# Patient Record
Sex: FEMALE | Race: BLACK OR AFRICAN AMERICAN | Employment: UNEMPLOYED | URBAN - METROPOLITAN AREA
[De-identification: names, ages, dates, MRNs, and addresses within clinical notes are randomized per-mention and may not be internally consistent; named-entity substitution may affect disease eponyms.]

---

## 2023-01-15 ENCOUNTER — APPOINTMENT (OUTPATIENT)
Dept: CT IMAGING | Age: 43
End: 2023-01-15
Attending: EMERGENCY MEDICINE
Payer: MEDICARE

## 2023-01-15 ENCOUNTER — HOSPITAL ENCOUNTER (EMERGENCY)
Age: 43
Discharge: HOME | End: 2023-01-15
Attending: EMERGENCY MEDICINE
Payer: MEDICARE

## 2023-01-15 VITALS
RESPIRATION RATE: 18 BRPM | HEART RATE: 89 BPM | DIASTOLIC BLOOD PRESSURE: 84 MMHG | TEMPERATURE: 98.1 F | BODY MASS INDEX: 28.93 KG/M2 | OXYGEN SATURATION: 100 % | HEIGHT: 66 IN | SYSTOLIC BLOOD PRESSURE: 139 MMHG | WEIGHT: 180 LBS

## 2023-01-15 DIAGNOSIS — M25.552 LEFT HIP PAIN: Primary | ICD-10-CM

## 2023-01-15 DIAGNOSIS — N20.0 BILATERAL NEPHROLITHIASIS: ICD-10-CM

## 2023-01-15 DIAGNOSIS — R31.29 MICROSCOPIC HEMATURIA: ICD-10-CM

## 2023-01-15 DIAGNOSIS — M54.42 ACUTE LEFT-SIDED LOW BACK PAIN WITH LEFT-SIDED SCIATICA: ICD-10-CM

## 2023-01-15 LAB
APPEARANCE UR: ABNORMAL
BACTERIA URNS QL MICRO: ABNORMAL /HPF
BILIRUB UR QL: NEGATIVE
COLOR UR: YELLOW
EPITH CASTS URNS QL MICRO: ABNORMAL /LPF
GLUCOSE UR STRIP.AUTO-MCNC: NEGATIVE MG/DL
HCG UR QL: NEGATIVE
HGB UR QL STRIP: ABNORMAL
KETONES UR QL STRIP.AUTO: NEGATIVE MG/DL
LEUKOCYTE ESTERASE UR QL STRIP.AUTO: NEGATIVE
NITRITE UR QL STRIP.AUTO: NEGATIVE
PH UR STRIP: 5 (ref 5–8)
PROT UR STRIP-MCNC: NEGATIVE MG/DL
RBC #/AREA URNS HPF: ABNORMAL /HPF (ref 0–5)
SP GR UR REFRACTOMETRY: 1.01 (ref 1–1.03)
UROBILINOGEN UR QL STRIP.AUTO: 0.1 EU/DL (ref 0.2–1)
WBC URNS QL MICRO: ABNORMAL /HPF (ref 0–4)

## 2023-01-15 PROCEDURE — 87086 URINE CULTURE/COLONY COUNT: CPT

## 2023-01-15 PROCEDURE — 99284 EMERGENCY DEPT VISIT MOD MDM: CPT

## 2023-01-15 PROCEDURE — 81001 URINALYSIS AUTO W/SCOPE: CPT

## 2023-01-15 PROCEDURE — 96372 THER/PROPH/DIAG INJ SC/IM: CPT

## 2023-01-15 PROCEDURE — 74176 CT ABD & PELVIS W/O CONTRAST: CPT

## 2023-01-15 PROCEDURE — 81025 URINE PREGNANCY TEST: CPT

## 2023-01-15 PROCEDURE — 74011250636 HC RX REV CODE- 250/636: Performed by: EMERGENCY MEDICINE

## 2023-01-15 PROCEDURE — 74011000250 HC RX REV CODE- 250: Performed by: EMERGENCY MEDICINE

## 2023-01-15 PROCEDURE — 74011250637 HC RX REV CODE- 250/637: Performed by: EMERGENCY MEDICINE

## 2023-01-15 RX ORDER — HYDROCODONE BITARTRATE AND ACETAMINOPHEN 5; 325 MG/1; MG/1
1 TABLET ORAL
Qty: 10 TABLET | Refills: 0 | Status: SHIPPED | OUTPATIENT
Start: 2023-01-15 | End: 2023-01-18

## 2023-01-15 RX ORDER — KETOROLAC TROMETHAMINE 30 MG/ML
60 INJECTION, SOLUTION INTRAMUSCULAR; INTRAVENOUS
Status: COMPLETED | OUTPATIENT
Start: 2023-01-15 | End: 2023-01-15

## 2023-01-15 RX ORDER — NAPROXEN 250 MG/1
250 TABLET ORAL 2 TIMES DAILY WITH MEALS
Qty: 10 TABLET | Refills: 0 | Status: SHIPPED | OUTPATIENT
Start: 2023-01-15 | End: 2023-01-20

## 2023-01-15 RX ORDER — LIDOCAINE 4 G/100G
1 PATCH TOPICAL ONCE
Status: DISCONTINUED | OUTPATIENT
Start: 2023-01-15 | End: 2023-01-16 | Stop reason: HOSPADM

## 2023-01-15 RX ORDER — SERTRALINE HYDROCHLORIDE 100 MG/1
100 TABLET, FILM COATED ORAL DAILY
COMMUNITY

## 2023-01-15 RX ORDER — LIDOCAINE 4 G/100G
1 PATCH TOPICAL EVERY 24 HOURS
Status: DISCONTINUED | OUTPATIENT
Start: 2023-01-15 | End: 2023-01-15

## 2023-01-15 RX ORDER — METHYLPREDNISOLONE 4 MG/1
TABLET ORAL
Qty: 1 DOSE PACK | Refills: 0 | Status: SHIPPED | OUTPATIENT
Start: 2023-01-15

## 2023-01-15 RX ORDER — GABAPENTIN 300 MG/1
300 CAPSULE ORAL 3 TIMES DAILY
COMMUNITY

## 2023-01-15 RX ORDER — OXYCODONE HYDROCHLORIDE 5 MG/1
10 TABLET ORAL
Status: COMPLETED | OUTPATIENT
Start: 2023-01-15 | End: 2023-01-15

## 2023-01-15 RX ORDER — TIZANIDINE 2 MG/1
2 TABLET ORAL
Qty: 12 TABLET | Refills: 0 | Status: SHIPPED | OUTPATIENT
Start: 2023-01-15 | End: 2023-01-19

## 2023-01-15 RX ADMIN — OXYCODONE HYDROCHLORIDE 10 MG: 5 TABLET ORAL at 21:08

## 2023-01-15 RX ADMIN — KETOROLAC TROMETHAMINE 60 MG: 30 INJECTION, SOLUTION INTRAMUSCULAR at 21:08

## 2023-01-15 NOTE — ED TRIAGE NOTES
Pt states lower back pain and groin pain worse with position change and movement x 4 days, now having polyuria

## 2023-01-16 NOTE — DISCHARGE INSTRUCTIONS
Thank you for allowing us to provide you with excellent care today. We hope we addressed all of your concerns and needs. We strive to provide excellent quality care in the Emergency Department. Anything less than excellent does not meet our expectations for you. Incidental findings are findings on labs or imaging studies that do not necessarily correlate with the reason for your visit. We make every effort to inform you of these incidental findings and the proper follow-up, however it is important that you call your primary care doctor or a primary care doctor in 1 to 2 days to set up a follow-up visit so they can go through your results in detail with you, and determine if additional testing is needed. The emergency room is not intended to be complete or comprehensive care, and it serves as a means to rule out life-threatening pathologies. It is very important that you follow-up with your primary care doctor to arrange additional testing and/or treatment if needed. Red flags for back pain would include inability to urinate or urinating on yourself (incontinence), incontinence of stool, numbness in the groin area such as around the genitals, intractable pain, high fevers or limb weakness. The exam and treatment you received in the Emergency Department were for an urgent problem and are not intended as complete care. It is important that you follow-up with a doctor, nurse practitioner, or physician assistant to:  (1) confirm your diagnosis,  (2) re-evaluation of changes in your illness and treatment, and  (3) for ongoing care. If your symptoms become worse or you do not improve as expected, or you develop any new symptoms that concern you and/or you are unable to reach your usual health care provider, you should return to the Emergency Department. We are available 24 hours a day.      If your blood pressure is greater than 120/80, your blood pressure is considered elevated above normal and you need to follow up with your primary care physician or the physician provided on your discharge instructions for a blood pressure recheck. If you were prescribed narcotic medications such as hydrocodone, oxycodone, diazepam, lorazepam, alprazolam, tramadol or codeine, these medications will NOT typically be refilled in the Emergency Room. Follow up with your primary care doctor or specialist to discuss this, or you may return to the Emergency room if your condition worsens. CT ABD PELV WO CONT   Final Result   Bilateral punctate nonobstructive nephrolithiasis. No bladder calculi or hydronephrosis. Diagnosis:   1. Left hip pain    2. Acute left-sided low back pain with left-sided sciatica          Take this sheet with you when you go to your follow-up visit. If you have any problem arranging the follow-up visit, contact [phone] ([phone])    Make an appointment with your Primary Care doctor for follow up of this visit. Return to the ER if you are unable to be seen in the time recommended on your discharge instructions. It has been a pleasure caring for you today. Return to the ER or seek medical care for any worsening in your condition at any time. [employer] Activation    Thank you for requesting access to [employer]. Please follow the instructions below to securely access and download your online medical record. [employer] allows you to send messages to your doctor, view your test results, renew your prescriptions, schedule appointments, and more. How Do I Sign Up? In your internet browser, go to [email or web address]  Click on the First Time User? Click Here link in the Sign In box. You will be redirect to the New Member Sign Up page. Enter your [employer] Access Code exactly as it appears below. You will not need to use this code after youve completed the sign-up process. If you do not sign up before the expiration date, you must request a new code. [employer] Access Code:  Activation code not generated  Current [employer] Status: Active (This is the date your [employer] access code will )    Enter the last four digits of your Social Security Number (xxxx) and Date of Birth (mm/dd/yyyy) as indicated and click Submit. You will be taken to the next sign-up page. Create a [employer] ID. This will be your [employer] login ID and cannot be changed, so think of one that is secure and easy to remember. Create a [employer] password. You can change your password at any time. Enter your Password Reset Question and Answer. This can be used at a later time if you forget your password. Enter your e-mail address. You will receive e-mail notification when new information is available in [address] Click Sign Up. You can now view and download portions of your medical record. Click the [employer] link to download a portable copy of your medical information. Additional Information    If you have questions, please visit the Frequently Asked Questions section of the [employer] website at [email or web address] Remember, [employer] is NOT to be used for urgent needs. For medical emergencies, dial 911.

## 2023-01-16 NOTE — ED PROVIDER NOTES
EMERGENCY DEPARTMENT HISTORY AND PHYSICAL EXAM      Date: 1/15/2023  Patient Name: Henrique Matamoros      History of Presenting Illness     Chief Complaint   Patient presents with    Groin Pain    Back Pain    Polyuria       History Provided By: Patient  Location/Duration/Severity/Modifying factors   Patient is a 43-year-old female who is presenting to the emergency department with a chief complaint of left groin hip and low back pain. She states this been going on for couple days now, she has a history of sciatica but states that pain does not usually involve the groin and abdomen area. Pain is worse on the left side but states she has a groin pain on both sides. It is present in the buttock but does not really radiate down the leg. Denies any chest pain or shortness of breath. She has not had a fever she is aware of no incontinence or retention of urine but notes may be some polyuria, denies any saddle anesthesia or any loss of strength in the legs. It is worsened by getting up and moving and putting weight on the left lower extremity        There are no other complaints, changes, or physical findings at this time. PCP: Other, MD Aleta    Current Facility-Administered Medications   Medication Dose Route Frequency Provider Last Rate Last Admin    lidocaine 4 % patch 1 Patch  1 Patch TransDERmal ONCE Regenia Delude, DO   1 Patch at 01/15/23 2108     Current Outpatient Medications   Medication Sig Dispense Refill    gabapentin (NEURONTIN) 300 mg capsule Take 300 mg by mouth three (3) times daily. methylPREDNISolone (Medrol, Pascual,) 4 mg tablet Take per dose pack instructions 1 Dose Pack 0    tiZANidine (ZANAFLEX) 2 mg tablet Take 1 Tablet by mouth three (3) times daily as needed for Muscle Spasm(s) for up to 4 days. 12 Tablet 0    HYDROcodone-acetaminophen (Norco) 5-325 mg per tablet Take 1 Tablet by mouth every six (6) hours as needed for Pain for up to 3 days. Max Daily Amount: 4 Tablets.  10 Tablet 0 naproxen (NAPROSYN) 250 mg tablet Take 1 Tablet by mouth two (2) times daily (with meals) for 5 days. 10 Tablet 0    sertraline (ZOLOFT) 100 mg tablet Take 100 mg by mouth daily. albuterol sulfate 90 mcg/actuation aepb Take 2 Puffs by inhalation four (4) times daily as needed for Cough (SOB, wheezing). 1 Inhaler 0       Past History     Past Medical History:  Past Medical History:   Diagnosis Date    Chronic back pain     Depression     Pain management     Psychiatric disorder     PTSD, anxiety    Sciatica        Past Surgical History:  Past Surgical History:   Procedure Laterality Date    HX LUMBAR FUSION      L4 L5 S1 with surgical implant       Family History:  Family History   Problem Relation Age of Onset    Diabetes Mother     Hypertension Mother     Diabetes Sister     Diabetes Brother     Hypertension Father     Hypertension Brother        Social History:  Social History     Tobacco Use    Smoking status: Every Day     Packs/day: 0.25     Years: 15.00     Pack years: 3.75     Types: Cigarettes   Vaping Use    Vaping Use: Never used   Substance Use Topics    Alcohol use: Yes     Comment: rarely    Drug use: Not Currently       Allergies: Allergies   Allergen Reactions    Reglan [Metoclopramide] Palpitations     \"Panic attacks\"    Pcn [Penicillins] Unknown (comments)       Medications:  Current Facility-Administered Medications   Medication Dose Route Frequency Provider Last Rate Last Admin    lidocaine 4 % patch 1 Patch  1 Patch TransDERmal ONCE Kaylin Rod, DO   1 Patch at 01/15/23 1459     Current Outpatient Medications   Medication Sig Dispense Refill    gabapentin (NEURONTIN) 300 mg capsule Take 300 mg by mouth three (3) times daily. methylPREDNISolone (Medrol, Pascual,) 4 mg tablet Take per dose pack instructions 1 Dose Pack 0    tiZANidine (ZANAFLEX) 2 mg tablet Take 1 Tablet by mouth three (3) times daily as needed for Muscle Spasm(s) for up to 4 days.  12 Tablet 0 HYDROcodone-acetaminophen (Norco) 5-325 mg per tablet Take 1 Tablet by mouth every six (6) hours as needed for Pain for up to 3 days. Max Daily Amount: 4 Tablets. 10 Tablet 0    naproxen (NAPROSYN) 250 mg tablet Take 1 Tablet by mouth two (2) times daily (with meals) for 5 days. 10 Tablet 0    sertraline (ZOLOFT) 100 mg tablet Take 100 mg by mouth daily. albuterol sulfate 90 mcg/actuation aepb Take 2 Puffs by inhalation four (4) times daily as needed for Cough (SOB, wheezing). 1 Inhaler 0       Social Determinants of Health:  Social Determinants of Health     Tobacco Use: High Risk    Smoking Tobacco Use: Every Day    Smokeless Tobacco Use: Unknown    Passive Exposure: Not on file   Alcohol Use: Not on file   Financial Resource Strain: Not on file   Food Insecurity: Not on file   Transportation Needs: Not on file   Physical Activity: Not on file   Stress: Not on file   Social Connections: Not on file   Intimate Partner Violence: Not on file   Depression: Not on file   Housing Stability: Not on file     Recently moved, currently employed. Review of Systems     Review of Systems   Constitutional:  Negative for chills and fever. HENT:  Negative for congestion, rhinorrhea, sinus pressure and sneezing. Eyes:  Negative for visual disturbance. Respiratory:  Negative for cough and shortness of breath. Cardiovascular:  Negative for chest pain. Gastrointestinal:  Positive for abdominal pain. Negative for diarrhea, nausea and vomiting. Genitourinary:  Positive for flank pain. Negative for dysuria, frequency and urgency.        + Groin pain   Musculoskeletal:  Positive for back pain. Negative for neck pain. Skin:  Negative for rash. Neurological:  Negative for syncope, numbness and headaches. Physical Exam     Physical Exam  Constitutional:       General: She is not in acute distress. Appearance: Normal appearance. She is not ill-appearing or toxic-appearing.       Comments: Laying on Right side HENT:      Head: Normocephalic and atraumatic. Nose: Nose normal.      Mouth/Throat:      Mouth: Mucous membranes are moist.      Pharynx: Oropharynx is clear. No oropharyngeal exudate or posterior oropharyngeal erythema. Eyes:      Conjunctiva/sclera: Conjunctivae normal.   Cardiovascular:      Rate and Rhythm: Normal rate and regular rhythm. Pulses: Normal pulses. Heart sounds: No murmur heard. No gallop. Pulmonary:      Effort: Pulmonary effort is normal. No respiratory distress. Breath sounds: No wheezing or rales. Abdominal:      General: Abdomen is flat. Tenderness: There is no abdominal tenderness. Hernia: No hernia is present. Musculoskeletal:         General: Normal range of motion. Cervical back: Neck supple. Comments: Normal sensory exam of the lower extremities. Positive right straight leg raise. 5/5 strength in hip flexion, hip extension, 5/5 strength in knee flexion and extension bilaterally. 5/5 strength in plantar dorsiflexion, EHL extension is 5/5 bilaterally. Skin:     General: Skin is warm and dry. Neurological:      General: No focal deficit present. Mental Status: She is alert.        Lab and Diagnostic Study Results     Labs -  Recent Results (from the past 24 hour(s))   URINALYSIS W/ RFLX MICROSCOPIC    Collection Time: 01/15/23  7:15 PM   Result Value Ref Range    Color Yellow      Appearance Hazy (A) Clear      Specific gravity 1.015 1.003 - 1.030      pH (UA) 5.0 5.0 - 8.0      Protein Negative Negative mg/dL    Glucose Negative Negative mg/dL    Ketone Negative Negative mg/dL    Bilirubin Negative Negative      Blood Large (A) Negative      Urobilinogen 0.1 (L) 0.2 - 1.0 EU/dL    Nitrites Negative Negative      Leukocyte Esterase Negative Negative     URINE MICROSCOPIC    Collection Time: 01/15/23  7:15 PM   Result Value Ref Range    WBC 0-4 0 - 4 /hpf    RBC 10-20 0 - 5 /hpf    Epithelial cells Few Few /lpf    Bacteria 1+ (A) Negative /hpf   POC HCG,URINE    Collection Time: 01/15/23  7:21 PM   Result Value Ref Range    Pregnancy test,urine (POC) Negative Negative           Radiologic Studies -   CT ABD PELV WO CONT   Final Result   Bilateral punctate nonobstructive nephrolithiasis. No bladder calculi or hydronephrosis. Medical Decision Making and ED Course   - I am the first and primary provider for this patient AND AM THE PRIMARY PROVIDER OF RECORD. - I reviewed the vital signs, available nursing notes, past medical history, past surgical history, family history and social history. - Initial assessment performed. The patients presenting problems have been discussed, and the staff are in agreement with the care plan formulated and outlined with them. I have encouraged them to ask questions as they arise throughout their visit. Vital Signs-Reviewed the patient's vital signs. Patient Vitals for the past 24 hrs:   Temp Pulse Resp BP SpO2   01/15/23 2240 98.1 °F (36.7 °C) 89 18 139/84 100 %   01/15/23 1903 99.1 °F (37.3 °C) 96 18 111/73 100 %         Nursing notes and pertinent past medical history including imaging and labs have been reviewed (if available)      Provider Notes (Medical Decision Making):     MDM  Number of Diagnoses or Management Options  Acute left-sided low back pain with left-sided sciatica  Bilateral nephrolithiasis  Left hip pain  Microscopic hematuria  Diagnosis management comments: Patient presents with left-sided low back and abdominal pain. Urine reflects some microscopic hematuria, raising question of possible kidney stone, will do CT to rule out kidney stone or other intra-abdominal or pelvic process. Her symptoms are not concerning for ovarian torsion, appendicitis or bowel obstruction. She does not have any symptoms concerning for cauda equina syndrome, conus medullaris or spinal epidural abscess. Ultimately, symptoms may be related to sciatica.     Results read patient reassessed. CT shows intrarenal punctate stones unlikely to cause patient any pain. Unclear etiology for the hematuria. We will do urine culture. Discussed with the patient and has had follow-up with her PCP to have a recheck of the urine to ensure that hematuria resolves. Suspect likely sciatica or musculoskeletal pain. Will treat with a Medrol Dosepak, pain medicine and muscle relaxer and close PCP follow-up. Vies follow-up with orthospine if needed as she has had previous back surgery. Advised to return if she develops any worsening symptoms. Results reviewed and patient reassessed. CT shows intrarenal stones without any obstructing kidney stone. Patient overall had improvement in her symptoms with analgesia and anti-inflammatory medications. Will treat for presumptive sciatica. Counseled on red flags for back pain, advised to return if any develop or any worsening in her general condition. Advise follow-up for microscopic hematuria. She is new to the area and does not currently have PCP resources. We will give her this as well as orthopedic/spine follow-up. ED Course:          _________________________________________________________________    At this time, patient is stable and appropriate for discharge home. Patient demonstrates understanding of current diagnoses and is in agreement with the treatment plan. They are advised that while the likelihood of serious underlying condition is low at this point given the evaluation performed today, we cannot fully rule it out. They are advised to immediately return with any new symptoms or worsening of current condition. Any Incidental findings were noted on the patient's discharge paperwork as well as verbally directly to the patient, and the appropriate follow-up was given to the patient as far as instructions on testing needed as well as the timeframe. All questions have been answered.   Patient is given educational material regarding their diagnoses, including danger symptoms and when to return to the ED. This note was dictated utilizing Dragon voice recognition software. Unfortunately this leads to occasional typographical errors. I apologize in advance if the situation occurs. If questions occur please do not hesitate to contact me directly. Michaelle Pulling, DO          Procedures and Critical Care   Performed by: Michaelle Pulling, DO  Procedures         Michaelle Pulling, DO      Diagnosis:   1. Left hip pain    2. Acute left-sided low back pain with left-sided sciatica    3. Bilateral nephrolithiasis    4. Microscopic hematuria          Disposition: Discharge    Follow-up Information       Follow up With Specialties Details Why Contact Info    Tamika Ann MD Orthopedic Surgery Call in 1 day Ortho/Spine [address] [place] [phone]  Call in 1 day Arrange follow up with Orthopedics.  [facility]  [phone]    [address] [facility] Call in 1 day PCP Follow Up [address] [facility] [place] [phone]  [address] [facility] In 1 day Primary Care follow up [place]  [address] [facility] Call in 1 day PCP Follow up [address] [facility] [phone]  [address] [place] [address] Call in 1 day PCP Follow Up [address] [facility] [address], [place] [address] [facility] [place]  Call today Low Cost/flexible PCP follow up [address] [place] [address]  [phone]            Discharge Medication List as of 1/15/2023 10:36 PM        START taking these medications Details   methylPREDNISolone (Medrol, Pascual,) 4 mg tablet Take per dose pack instructions, Normal, Disp-1 Dose Pack, R-0      tiZANidine (ZANAFLEX) 2 mg tablet Take 1 Tablet by mouth three (3) times daily as needed for Muscle Spasm(s) for up to 4 days. , Normal, Disp-12 Tablet, R-0      HYDROcodone-acetaminophen (Norco) 5-325 mg per tablet Take 1 Tablet by mouth every six (6) hours as needed for Pain for up to 3 days. Max Daily Amount: 4 Tablets., Normal, Disp-10 Tablet, R-0      naproxen (NAPROSYN) 250 mg tablet Take 1 Tablet by mouth two (2) times daily (with meals) for 5 days. , Normal, Disp-10 Tablet, R-0           CONTINUE these medications which have NOT CHANGED    Details   gabapentin (NEURONTIN) 300 mg capsule Take 300 mg by mouth three (3) times daily. , Historical Med      sertraline (ZOLOFT) 100 mg tablet Take 100 mg by mouth daily. , Historical Med      albuterol sulfate 90 mcg/actuation aepb Take 2 Puffs by inhalation four (4) times daily as needed for Cough (SOB, wheezing). , Print, Disp-1 Inhaler, R-0             Patient seen in the context of the Novel Coronavirus (COVID19) pandemic, utilizing contemporary protocols and evidence based on the most up to date available evidence, understanding that the current evidence has the potential to change as additional information becomes available. This note is dictated utilizing Dragon voice recognition software. Unfortunately this leads to occasional typographical errors using the voice recognition. I apologize in advance if the situation occurs. If questions occur please do not hesitate to contact me directly.     Estrada Ingram, DO

## 2023-01-17 LAB
BACTERIA SPEC CULT: NORMAL
SPECIAL REQUESTS,SREQ: NORMAL

## 2023-03-08 ENCOUNTER — TRANSCRIBE ORDER (OUTPATIENT)
Dept: SCHEDULING | Age: 43
End: 2023-03-08

## 2023-03-08 DIAGNOSIS — Z98.1 HISTORY OF LUMBAR SPINAL FUSION: Primary | ICD-10-CM

## 2023-03-08 DIAGNOSIS — M54.16 CHRONIC LEFT-SIDED LUMBAR RADICULOPATHY: ICD-10-CM

## 2023-08-21 ENCOUNTER — HOSPITAL ENCOUNTER (EMERGENCY)
Facility: HOSPITAL | Age: 43
Discharge: LEFT BEFORE DISCHARGE | End: 2023-08-21
Payer: MEDICARE

## 2023-08-21 VITALS
TEMPERATURE: 98.5 F | WEIGHT: 168 LBS | RESPIRATION RATE: 19 BRPM | HEIGHT: 66 IN | BODY MASS INDEX: 27 KG/M2 | DIASTOLIC BLOOD PRESSURE: 81 MMHG | OXYGEN SATURATION: 99 % | SYSTOLIC BLOOD PRESSURE: 131 MMHG | HEART RATE: 105 BPM

## 2023-08-21 PROCEDURE — 93005 ELECTROCARDIOGRAM TRACING: CPT | Performed by: NURSE PRACTITIONER

## 2023-08-21 ASSESSMENT — PAIN - FUNCTIONAL ASSESSMENT: PAIN_FUNCTIONAL_ASSESSMENT: 0-10

## 2023-08-21 ASSESSMENT — PAIN DESCRIPTION - LOCATION: LOCATION: CHEST;BACK

## 2023-08-21 ASSESSMENT — PAIN SCALES - GENERAL: PAINLEVEL_OUTOF10: 8

## 2023-08-21 NOTE — ED TRIAGE NOTES
Pt endorses back pain that started this morning around 0600 with onset of sharp chest pain that started around 1100.  Pt denies cardiac hx

## 2023-08-22 LAB
EKG ATRIAL RATE: 94 BPM
EKG DIAGNOSIS: NORMAL
EKG P AXIS: 79 DEGREES
EKG P-R INTERVAL: 160 MS
EKG Q-T INTERVAL: 368 MS
EKG QRS DURATION: 72 MS
EKG QTC CALCULATION (BAZETT): 460 MS
EKG R AXIS: 68 DEGREES
EKG T AXIS: 64 DEGREES
EKG VENTRICULAR RATE: 94 BPM

## 2023-08-23 ENCOUNTER — HOSPITAL ENCOUNTER (EMERGENCY)
Facility: HOSPITAL | Age: 43
Discharge: LEFT BEFORE DISCHARGE | End: 2023-08-23
Attending: FAMILY MEDICINE

## 2023-08-23 NOTE — ED TRIAGE NOTES
Pt recently [facility] at approx 1200 today. Presents now for abdominal pain that was being worked up for cholecystitis. Pt walked out during triage after finding out that [employer] was not a [facility] facility and she could stated \"I cannot be charged twice for MRI's. I'm not going to pay for that\"  Pt then proceeded to get up and leave the triage room.

## 2023-09-07 ENCOUNTER — APPOINTMENT (OUTPATIENT)
Facility: HOSPITAL | Age: 43
End: 2023-09-07
Payer: MEDICARE

## 2023-09-07 ENCOUNTER — HOSPITAL ENCOUNTER (EMERGENCY)
Facility: HOSPITAL | Age: 43
Discharge: HOME | End: 2023-09-07
Attending: EMERGENCY MEDICINE | Admitting: EMERGENCY MEDICINE
Payer: MEDICARE

## 2023-09-07 VITALS
HEIGHT: 66 IN | DIASTOLIC BLOOD PRESSURE: 72 MMHG | RESPIRATION RATE: 15 BRPM | HEART RATE: 88 BPM | BODY MASS INDEX: 27.32 KG/M2 | SYSTOLIC BLOOD PRESSURE: 105 MMHG | WEIGHT: 170 LBS | TEMPERATURE: 98.6 F | OXYGEN SATURATION: 98 %

## 2023-09-07 DIAGNOSIS — R10.9 ABDOMINAL PAIN, UNSPECIFIED ABDOMINAL LOCATION: ICD-10-CM

## 2023-09-07 DIAGNOSIS — D72.829 LEUKOCYTOSIS, UNSPECIFIED TYPE: Primary | ICD-10-CM

## 2023-09-07 DIAGNOSIS — R31.9 HEMATURIA, UNSPECIFIED TYPE: ICD-10-CM

## 2023-09-07 LAB
ALBUMIN SERPL-MCNC: 3.3 G/DL (ref 3.5–5)
ALBUMIN/GLOB SERPL: 0.8 (ref 1.1–2.2)
ALP SERPL-CCNC: 76 U/L (ref 45–117)
ALT SERPL-CCNC: 17 U/L (ref 12–78)
ANION GAP SERPL CALC-SCNC: 6 MMOL/L (ref 5–15)
APPEARANCE UR: CLEAR
AST SERPL-CCNC: 10 U/L (ref 15–37)
BACTERIA URNS QL MICRO: NEGATIVE /HPF
BASOPHILS # BLD: 0 K/UL (ref 0–0.1)
BASOPHILS NFR BLD: 0 % (ref 0–1)
BILIRUB SERPL-MCNC: 0.3 MG/DL (ref 0.2–1)
BILIRUB UR QL: NEGATIVE
BUN SERPL-MCNC: 7 MG/DL (ref 6–20)
BUN/CREAT SERPL: 7 (ref 12–20)
CALCIUM SERPL-MCNC: 8.6 MG/DL (ref 8.5–10.1)
CHLORIDE SERPL-SCNC: 108 MMOL/L (ref 97–108)
CO2 SERPL-SCNC: 26 MMOL/L (ref 21–32)
COLOR UR: ABNORMAL
CREAT SERPL-MCNC: 1.01 MG/DL (ref 0.55–1.02)
DIFFERENTIAL METHOD BLD: ABNORMAL
EOSINOPHIL # BLD: 0 K/UL (ref 0–0.4)
EOSINOPHIL NFR BLD: 0 % (ref 0–7)
EPITH CASTS URNS QL MICRO: ABNORMAL /LPF
ERYTHROCYTE [DISTWIDTH] IN BLOOD BY AUTOMATED COUNT: 14.4 % (ref 11.5–14.5)
GLOBULIN SER CALC-MCNC: 4 G/DL (ref 2–4)
GLUCOSE SERPL-MCNC: 146 MG/DL (ref 65–100)
GLUCOSE UR STRIP.AUTO-MCNC: NEGATIVE MG/DL
HCG UR QL: NEGATIVE
HCT VFR BLD AUTO: 35.4 % (ref 35–47)
HGB BLD-MCNC: 11.9 G/DL (ref 11.5–16)
HGB UR QL STRIP: ABNORMAL
IMM GRANULOCYTES # BLD AUTO: 0 K/UL (ref 0–0.04)
IMM GRANULOCYTES NFR BLD AUTO: 0 % (ref 0–0.5)
KETONES UR QL STRIP.AUTO: ABNORMAL MG/DL
LACTATE SERPL-SCNC: 2.1 MMOL/L (ref 0.4–2)
LEUKOCYTE ESTERASE UR QL STRIP.AUTO: NEGATIVE
LIPASE SERPL-CCNC: 86 U/L (ref 73–393)
LYMPHOCYTES # BLD: 6.4 K/UL (ref 0.8–3.5)
LYMPHOCYTES NFR BLD: 46 % (ref 12–49)
MCH RBC QN AUTO: 23 PG (ref 26–34)
MCHC RBC AUTO-ENTMCNC: 33.6 G/DL (ref 30–36.5)
MCV RBC AUTO: 68.3 FL (ref 80–99)
MONOCYTES # BLD: 0.4 K/UL (ref 0–1)
MONOCYTES NFR BLD: 3 % (ref 5–13)
NEUTS BAND NFR BLD MANUAL: 2 %
NEUTS SEG # BLD: 7.2 K/UL (ref 1.8–8)
NEUTS SEG NFR BLD: 49 % (ref 32–75)
NITRITE UR QL STRIP.AUTO: NEGATIVE
NRBC # BLD: 0 K/UL (ref 0–0.01)
NRBC BLD-RTO: 0 PER 100 WBC
PH UR STRIP: 5.5 (ref 5–8)
PLATELET # BLD AUTO: 259 K/UL (ref 150–400)
PMV BLD AUTO: 9.5 FL (ref 8.9–12.9)
POTASSIUM SERPL-SCNC: 3.7 MMOL/L (ref 3.5–5.1)
PROT SERPL-MCNC: 7.3 G/DL (ref 6.4–8.2)
PROT UR STRIP-MCNC: NEGATIVE MG/DL
RBC # BLD AUTO: 5.18 M/UL (ref 3.8–5.2)
RBC #/AREA URNS HPF: ABNORMAL /HPF (ref 0–5)
RBC MORPH BLD: ABNORMAL
RBC MORPH BLD: ABNORMAL
SODIUM SERPL-SCNC: 140 MMOL/L (ref 136–145)
SP GR UR REFRACTOMETRY: 1.01 (ref 1–1.03)
SPECIMEN HOLD: NORMAL
UROBILINOGEN UR QL STRIP.AUTO: 1 EU/DL (ref 0.2–1)
WBC # BLD AUTO: 14 K/UL (ref 3.6–11)
WBC MORPH BLD: ABNORMAL
WBC URNS QL MICRO: ABNORMAL /HPF (ref 0–4)

## 2023-09-07 PROCEDURE — 83605 ASSAY OF LACTIC ACID: CPT

## 2023-09-07 PROCEDURE — 74177 CT ABD & PELVIS W/CONTRAST: CPT

## 2023-09-07 PROCEDURE — 99285 EMERGENCY DEPT VISIT HI MDM: CPT

## 2023-09-07 PROCEDURE — 81001 URINALYSIS AUTO W/SCOPE: CPT

## 2023-09-07 PROCEDURE — 6360000002 HC RX W HCPCS: Performed by: STUDENT IN AN ORGANIZED HEALTH CARE EDUCATION/TRAINING PROGRAM

## 2023-09-07 PROCEDURE — 36415 COLL VENOUS BLD VENIPUNCTURE: CPT

## 2023-09-07 PROCEDURE — 6360000004 HC RX CONTRAST MEDICATION: Performed by: STUDENT IN AN ORGANIZED HEALTH CARE EDUCATION/TRAINING PROGRAM

## 2023-09-07 PROCEDURE — 83690 ASSAY OF LIPASE: CPT

## 2023-09-07 PROCEDURE — 2580000003 HC RX 258: Performed by: STUDENT IN AN ORGANIZED HEALTH CARE EDUCATION/TRAINING PROGRAM

## 2023-09-07 PROCEDURE — 85025 COMPLETE CBC W/AUTO DIFF WBC: CPT

## 2023-09-07 PROCEDURE — 96361 HYDRATE IV INFUSION ADD-ON: CPT

## 2023-09-07 PROCEDURE — 96374 THER/PROPH/DIAG INJ IV PUSH: CPT

## 2023-09-07 PROCEDURE — 81025 URINE PREGNANCY TEST: CPT

## 2023-09-07 PROCEDURE — 96375 TX/PRO/DX INJ NEW DRUG ADDON: CPT

## 2023-09-07 PROCEDURE — 80053 COMPREHEN METABOLIC PANEL: CPT

## 2023-09-07 RX ORDER — 0.9 % SODIUM CHLORIDE 0.9 %
1000 INTRAVENOUS SOLUTION INTRAVENOUS ONCE
Status: COMPLETED | OUTPATIENT
Start: 2023-09-07 | End: 2023-09-07

## 2023-09-07 RX ORDER — ONDANSETRON 2 MG/ML
4 INJECTION INTRAMUSCULAR; INTRAVENOUS ONCE
Status: COMPLETED | OUTPATIENT
Start: 2023-09-07 | End: 2023-09-07

## 2023-09-07 RX ORDER — MORPHINE SULFATE 4 MG/ML
4 INJECTION, SOLUTION INTRAMUSCULAR; INTRAVENOUS
Status: COMPLETED | OUTPATIENT
Start: 2023-09-07 | End: 2023-09-07

## 2023-09-07 RX ADMIN — MORPHINE SULFATE 4 MG: 4 INJECTION, SOLUTION INTRAMUSCULAR; INTRAVENOUS at 20:12

## 2023-09-07 RX ADMIN — ONDANSETRON 4 MG: 2 INJECTION INTRAMUSCULAR; INTRAVENOUS at 20:12

## 2023-09-07 RX ADMIN — IOPAMIDOL 100 ML: 755 INJECTION, SOLUTION INTRAVENOUS at 21:20

## 2023-09-07 RX ADMIN — SODIUM CHLORIDE 1000 ML: 9 INJECTION, SOLUTION INTRAVENOUS at 20:11

## 2023-09-07 ASSESSMENT — ENCOUNTER SYMPTOMS
DIARRHEA: 0
COUGH: 0
ABDOMINAL DISTENTION: 0
BACK PAIN: 0
SHORTNESS OF BREATH: 0
ABDOMINAL PAIN: 1
ANAL BLEEDING: 0
CONSTIPATION: 0
COLOR CHANGE: 0
NAUSEA: 0
SORE THROAT: 0
BLOOD IN STOOL: 0
VOMITING: 0

## 2023-09-07 ASSESSMENT — PAIN SCALES - GENERAL
PAINLEVEL_OUTOF10: 4
PAINLEVEL_OUTOF10: 7

## 2023-09-07 ASSESSMENT — PAIN DESCRIPTION - ORIENTATION: ORIENTATION: OUTER

## 2023-09-07 ASSESSMENT — PAIN DESCRIPTION - DESCRIPTORS
DESCRIPTORS: SORE
DESCRIPTORS: TIGHTNESS

## 2023-09-07 ASSESSMENT — PAIN DESCRIPTION - LOCATION
LOCATION: ABDOMEN;NECK
LOCATION: GROIN

## 2023-09-07 ASSESSMENT — PAIN - FUNCTIONAL ASSESSMENT: PAIN_FUNCTIONAL_ASSESSMENT: 0-10

## 2023-09-07 NOTE — ED TRIAGE NOTES
Patient arrives to ed via pov with c/o groin soreness, neck soreness, lower abdominal pain and fever referred from [facility] for further workup for infection. Pt was admitted at [address] [place] [address] on the 23rd until the 26th for elevated WBC and given abx and IVF. Pt sts they never figured out what the infection was. Pt moving head and neck freely.

## 2023-09-08 NOTE — DISCHARGE INSTRUCTIONS
Please follow-up with primary care physician whose information is provided. If you develop fever, chills, worsening pain or other concerning symptoms please return to the ER.

## 2023-09-08 NOTE — ED NOTES
I have reviewed discharge instructions with the pt and she has verbalized an understanding of the instructions given     Abbey Mata RN  09/07/23 4853

## 2023-11-09 ENCOUNTER — HOSPITAL ENCOUNTER (EMERGENCY)
Facility: HOSPITAL | Age: 43
Discharge: LEFT BEFORE DISCHARGE | End: 2023-11-09
Attending: EMERGENCY MEDICINE
Payer: MEDICARE

## 2023-11-09 VITALS
OXYGEN SATURATION: 96 % | BODY MASS INDEX: 26.52 KG/M2 | DIASTOLIC BLOOD PRESSURE: 81 MMHG | HEIGHT: 66 IN | HEART RATE: 95 BPM | TEMPERATURE: 100.2 F | WEIGHT: 165 LBS | RESPIRATION RATE: 18 BRPM | SYSTOLIC BLOOD PRESSURE: 136 MMHG

## 2023-11-09 LAB
AMPHET UR QL SCN: POSITIVE
APPEARANCE UR: ABNORMAL
BACTERIA URNS QL MICRO: ABNORMAL /HPF
BARBITURATES UR QL SCN: NEGATIVE
BENZODIAZ UR QL: NEGATIVE
BILIRUB UR QL CFM: POSITIVE
BILIRUB UR QL: ABNORMAL
BUPRENORPHINE UR QL: NEGATIVE
CANNABINOIDS UR QL SCN: POSITIVE
COCAINE UR QL SCN: NEGATIVE
COLOR UR: YELLOW
EPITH CASTS URNS QL MICRO: ABNORMAL /LPF
GLUCOSE UR STRIP.AUTO-MCNC: NEGATIVE MG/DL
HCG UR QL: NEGATIVE
HGB UR QL STRIP: ABNORMAL
KETONES UR QL STRIP.AUTO: 50 MG/DL
LEUKOCYTE ESTERASE UR QL STRIP.AUTO: NEGATIVE
Lab: ABNORMAL
METHADONE UR QL: POSITIVE
METHAMPHET UR QL: POSITIVE
NITRITE UR QL STRIP.AUTO: NEGATIVE
OPIATES UR QL: NEGATIVE
OXYCODONE UR QL SCN: NEGATIVE
PCP UR QL: NEGATIVE
PH UR STRIP: 5 (ref 5–8)
PROPOXYPH UR QL: NEGATIVE
PROT UR STRIP-MCNC: ABNORMAL MG/DL
RBC #/AREA URNS HPF: ABNORMAL /HPF (ref 0–5)
SP GR UR REFRACTOMETRY: 1.02 (ref 1–1.03)
TRICYCLICS UR QL: NEGATIVE
UROBILINOGEN UR QL STRIP.AUTO: 1 EU/DL (ref 0.2–1)
WBC URNS QL MICRO: ABNORMAL /HPF (ref 0–4)

## 2023-11-09 PROCEDURE — 81001 URINALYSIS AUTO W/SCOPE: CPT

## 2023-11-09 PROCEDURE — 93005 ELECTROCARDIOGRAM TRACING: CPT | Performed by: EMERGENCY MEDICINE

## 2023-11-09 PROCEDURE — 81025 URINE PREGNANCY TEST: CPT

## 2023-11-09 PROCEDURE — 4500000002 HC ER NO CHARGE

## 2023-11-09 PROCEDURE — 80307 DRUG TEST PRSMV CHEM ANLYZR: CPT

## 2023-11-09 ASSESSMENT — PAIN - FUNCTIONAL ASSESSMENT: PAIN_FUNCTIONAL_ASSESSMENT: 0-10

## 2023-11-09 ASSESSMENT — LIFESTYLE VARIABLES
HOW OFTEN DO YOU HAVE A DRINK CONTAINING ALCOHOL: NEVER
HOW MANY STANDARD DRINKS CONTAINING ALCOHOL DO YOU HAVE ON A TYPICAL DAY: PATIENT DOES NOT DRINK

## 2023-11-09 ASSESSMENT — PAIN SCALES - GENERAL: PAINLEVEL_OUTOF10: 7
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
## 2023-11-10 NOTE — ED NOTES
Dr. Rosalva Man went in to see patient and she was not in the room. This RN waited and checked back and then checked the unit and patient was no where to be found. Registration stated she walked out.      Bladimir Kaufman, [address]  11/09/23 6597 (4) walks frequently

## 2023-11-11 LAB
EKG ATRIAL RATE: 89 BPM
EKG DIAGNOSIS: NORMAL
EKG P AXIS: 71 DEGREES
EKG P-R INTERVAL: 156 MS
EKG Q-T INTERVAL: 382 MS
EKG QRS DURATION: 78 MS
EKG QTC CALCULATION (BAZETT): 464 MS
EKG R AXIS: 49 DEGREES
EKG T AXIS: 53 DEGREES
EKG VENTRICULAR RATE: 89 BPM

## 2023-11-12 ENCOUNTER — HOSPITAL ENCOUNTER (INPATIENT)
Facility: HOSPITAL | Age: 43
LOS: 4 days | Discharge: HOME | End: 2023-11-17
Attending: EMERGENCY MEDICINE | Admitting: PSYCHIATRY & NEUROLOGY
Payer: MEDICARE

## 2023-11-12 DIAGNOSIS — F20.0 PARANOID SCHIZOPHRENIA (HCC): Primary | ICD-10-CM

## 2023-11-12 LAB
AMPHET UR QL SCN: POSITIVE
APPEARANCE UR: CLEAR
BACTERIA URNS QL MICRO: NEGATIVE /HPF
BARBITURATES UR QL SCN: NEGATIVE
BENZODIAZ UR QL: NEGATIVE
BILIRUB UR QL CFM: POSITIVE
BILIRUB UR QL: POSITIVE
CANNABINOIDS UR QL SCN: POSITIVE
COCAINE UR QL SCN: NEGATIVE
COLOR UR: ABNORMAL
GLUCOSE UR STRIP.AUTO-MCNC: NEGATIVE MG/DL
HCG UR QL: NEGATIVE
HGB UR QL STRIP: ABNORMAL
KETONES UR QL STRIP.AUTO: >80 MG/DL
LEUKOCYTE ESTERASE UR QL STRIP.AUTO: ABNORMAL
Lab: ABNORMAL
MDMA URINE: NEGATIVE
METHADONE UR QL: POSITIVE
MUCOUS THREADS URNS QL MICRO: ABNORMAL /LPF
NITRITE UR QL STRIP.AUTO: NEGATIVE
OPIATES UR QL: NEGATIVE
PCP UR QL: NEGATIVE
PH UR STRIP: 6 (ref 5–8)
PROT UR STRIP-MCNC: 30 MG/DL
RBC #/AREA URNS HPF: ABNORMAL /HPF (ref 0–3)
SP GR UR REFRACTOMETRY: 1.02 (ref 1–1.03)
UROBILINOGEN UR QL STRIP.AUTO: 1 EU/DL (ref 0.2–1)
WBC URNS QL MICRO: ABNORMAL /HPF (ref 0–5)

## 2023-11-12 PROCEDURE — 81001 URINALYSIS AUTO W/SCOPE: CPT

## 2023-11-12 PROCEDURE — 81025 URINE PREGNANCY TEST: CPT

## 2023-11-12 PROCEDURE — 80307 DRUG TEST PRSMV CHEM ANLYZR: CPT

## 2023-11-12 PROCEDURE — 99285 EMERGENCY DEPT VISIT HI MDM: CPT

## 2023-11-13 PROBLEM — F29 PSYCHOSIS, UNSPECIFIED PSYCHOSIS TYPE (HCC): Status: ACTIVE | Noted: 2023-11-13

## 2023-11-13 PROBLEM — F32.2 MDD (MAJOR DEPRESSIVE DISORDER), SEVERE (HCC): Status: ACTIVE | Noted: 2023-11-13

## 2023-11-13 LAB
ALBUMIN SERPL-MCNC: 3.7 G/DL (ref 3.5–5)
ALBUMIN/GLOB SERPL: 1 (ref 1.1–2.2)
ALP SERPL-CCNC: 77 U/L (ref 45–117)
ALT SERPL-CCNC: 11 U/L (ref 12–78)
ANION GAP SERPL CALC-SCNC: 11 MMOL/L (ref 5–15)
ANION GAP SERPL CALC-SCNC: 13 MMOL/L (ref 5–15)
ANION GAP SERPL CALC-SCNC: 13 MMOL/L (ref 5–15)
AST SERPL W P-5'-P-CCNC: 12 U/L (ref 15–37)
BASOPHILS # BLD: 0 K/UL (ref 0–0.1)
BASOPHILS NFR BLD: 0 % (ref 0–1)
BILIRUB SERPL-MCNC: 0.4 MG/DL (ref 0.2–1)
BUN SERPL-MCNC: 5 MG/DL (ref 6–20)
BUN SERPL-MCNC: 6 MG/DL (ref 6–20)
BUN SERPL-MCNC: 7 MG/DL (ref 6–20)
BUN/CREAT SERPL: 6 (ref 12–20)
BUN/CREAT SERPL: 8 (ref 12–20)
BUN/CREAT SERPL: 9 (ref 12–20)
CA-I BLD-MCNC: 8 MG/DL (ref 8.5–10.1)
CA-I BLD-MCNC: 8.6 MG/DL (ref 8.5–10.1)
CA-I BLD-MCNC: 8.8 MG/DL (ref 8.5–10.1)
CHLORIDE SERPL-SCNC: 100 MMOL/L (ref 97–108)
CHLORIDE SERPL-SCNC: 100 MMOL/L (ref 97–108)
CHLORIDE SERPL-SCNC: 101 MMOL/L (ref 97–108)
CO2 SERPL-SCNC: 24 MMOL/L (ref 21–32)
CO2 SERPL-SCNC: 25 MMOL/L (ref 21–32)
CO2 SERPL-SCNC: 25 MMOL/L (ref 21–32)
CREAT SERPL-MCNC: 0.78 MG/DL (ref 0.55–1.02)
CREAT SERPL-MCNC: 0.79 MG/DL (ref 0.55–1.02)
CREAT SERPL-MCNC: 0.9 MG/DL (ref 0.55–1.02)
DIFFERENTIAL METHOD BLD: ABNORMAL
EOSINOPHIL # BLD: 0 K/UL (ref 0–0.4)
EOSINOPHIL NFR BLD: 0 % (ref 0–7)
ERYTHROCYTE [DISTWIDTH] IN BLOOD BY AUTOMATED COUNT: 13.6 % (ref 11.5–14.5)
ETHANOL SERPL-MCNC: <10 MG/DL (ref 0–0.08)
FLUAV RNA SPEC QL NAA+PROBE: NOT DETECTED
FLUBV RNA SPEC QL NAA+PROBE: NOT DETECTED
GLOBULIN SER CALC-MCNC: 3.7 G/DL (ref 2–4)
GLUCOSE SERPL-MCNC: 131 MG/DL (ref 65–100)
GLUCOSE SERPL-MCNC: 191 MG/DL (ref 65–100)
GLUCOSE SERPL-MCNC: 92 MG/DL (ref 65–100)
HCT VFR BLD AUTO: 35.2 % (ref 35–47)
HGB BLD-MCNC: 12.3 G/DL (ref 11.5–16)
IMM GRANULOCYTES # BLD AUTO: 0 K/UL (ref 0–0.04)
IMM GRANULOCYTES NFR BLD AUTO: 0 % (ref 0–0.5)
LYMPHOCYTES # BLD: 6.4 K/UL (ref 0.8–3.5)
LYMPHOCYTES NFR BLD: 34 % (ref 12–49)
MAGNESIUM SERPL-MCNC: 1.7 MG/DL (ref 1.6–2.4)
MCH RBC QN AUTO: 23.2 PG (ref 26–34)
MCHC RBC AUTO-ENTMCNC: 34.9 G/DL (ref 30–36.5)
MCV RBC AUTO: 66.4 FL (ref 80–99)
MONOCYTES # BLD: 0.9 K/UL (ref 0–1)
MONOCYTES NFR BLD: 5 % (ref 5–13)
NEUTS SEG # BLD: 11.5 K/UL (ref 1.8–8)
NEUTS SEG NFR BLD: 61 % (ref 32–75)
NRBC # BLD: 0 K/UL (ref 0–0.01)
NRBC BLD-RTO: 0 PER 100 WBC
PLATELET # BLD AUTO: 226 K/UL (ref 150–400)
PMV BLD AUTO: 10.3 FL (ref 8.9–12.9)
POTASSIUM SERPL-SCNC: 2.6 MMOL/L (ref 3.5–5.1)
POTASSIUM SERPL-SCNC: 2.9 MMOL/L (ref 3.5–5.1)
POTASSIUM SERPL-SCNC: 3.2 MMOL/L (ref 3.5–5.1)
PROT SERPL-MCNC: 7.4 G/DL (ref 6.4–8.2)
RBC # BLD AUTO: 5.3 M/UL (ref 3.8–5.2)
RBC MORPH BLD: ABNORMAL
SARS-COV-2 RNA RESP QL NAA+PROBE: NOT DETECTED
SODIUM SERPL-SCNC: 137 MMOL/L (ref 136–145)
SODIUM SERPL-SCNC: 137 MMOL/L (ref 136–145)
SODIUM SERPL-SCNC: 138 MMOL/L (ref 136–145)
WBC # BLD AUTO: 18.8 K/UL (ref 3.6–11)

## 2023-11-13 PROCEDURE — 96375 TX/PRO/DX INJ NEW DRUG ADDON: CPT

## 2023-11-13 PROCEDURE — 6360000002 HC RX W HCPCS: Performed by: EMERGENCY MEDICINE

## 2023-11-13 PROCEDURE — 87636 SARSCOV2 & INF A&B AMP PRB: CPT

## 2023-11-13 PROCEDURE — 96372 THER/PROPH/DIAG INJ SC/IM: CPT

## 2023-11-13 PROCEDURE — 6370000000 HC RX 637 (ALT 250 FOR IP): Performed by: EMERGENCY MEDICINE

## 2023-11-13 PROCEDURE — 82077 ASSAY SPEC XCP UR&BREATH IA: CPT

## 2023-11-13 PROCEDURE — 96366 THER/PROPH/DIAG IV INF ADDON: CPT

## 2023-11-13 PROCEDURE — 1240000000 HC EMOTIONAL WELLNESS R&B

## 2023-11-13 PROCEDURE — 2500000003 HC RX 250 WO HCPCS: Performed by: EMERGENCY MEDICINE

## 2023-11-13 PROCEDURE — 80048 BASIC METABOLIC PNL TOTAL CA: CPT

## 2023-11-13 PROCEDURE — 85025 COMPLETE CBC W/AUTO DIFF WBC: CPT

## 2023-11-13 PROCEDURE — 96365 THER/PROPH/DIAG IV INF INIT: CPT

## 2023-11-13 PROCEDURE — 80053 COMPREHEN METABOLIC PANEL: CPT

## 2023-11-13 PROCEDURE — 36415 COLL VENOUS BLD VENIPUNCTURE: CPT

## 2023-11-13 PROCEDURE — 6370000000 HC RX 637 (ALT 250 FOR IP): Performed by: PSYCHIATRY & NEUROLOGY

## 2023-11-13 PROCEDURE — 83735 ASSAY OF MAGNESIUM: CPT

## 2023-11-13 RX ORDER — ONDANSETRON 4 MG/1
4 TABLET, ORALLY DISINTEGRATING ORAL EVERY 8 HOURS PRN
Status: DISCONTINUED | OUTPATIENT
Start: 2023-11-13 | End: 2023-11-17 | Stop reason: HOSPADM

## 2023-11-13 RX ORDER — HALOPERIDOL 5 MG/ML
5 INJECTION INTRAMUSCULAR EVERY 4 HOURS PRN
Status: DISCONTINUED | OUTPATIENT
Start: 2023-11-13 | End: 2023-11-17 | Stop reason: HOSPADM

## 2023-11-13 RX ORDER — METHADONE HYDROCHLORIDE 10 MG/ML
32 CONCENTRATE ORAL ONCE
Status: COMPLETED | OUTPATIENT
Start: 2023-11-13 | End: 2023-11-13

## 2023-11-13 RX ORDER — TRAZODONE HYDROCHLORIDE 50 MG/1
50 TABLET ORAL NIGHTLY PRN
Status: CANCELLED | OUTPATIENT
Start: 2023-11-13

## 2023-11-13 RX ORDER — TRAZODONE HYDROCHLORIDE 50 MG/1
50 TABLET ORAL NIGHTLY PRN
Status: DISCONTINUED | OUTPATIENT
Start: 2023-11-13 | End: 2023-11-17 | Stop reason: HOSPADM

## 2023-11-13 RX ORDER — NICOTINE 21 MG/24HR
1 PATCH, TRANSDERMAL 24 HOURS TRANSDERMAL DAILY
Status: DISCONTINUED | OUTPATIENT
Start: 2023-11-13 | End: 2023-11-17 | Stop reason: HOSPADM

## 2023-11-13 RX ORDER — MAGNESIUM HYDROXIDE/ALUMINUM HYDROXICE/SIMETHICONE 120; 1200; 1200 MG/30ML; MG/30ML; MG/30ML
30 SUSPENSION ORAL EVERY 6 HOURS PRN
Status: DISCONTINUED | OUTPATIENT
Start: 2023-11-13 | End: 2023-11-17 | Stop reason: HOSPADM

## 2023-11-13 RX ORDER — ACETAMINOPHEN 325 MG/1
650 TABLET ORAL EVERY 4 HOURS PRN
Status: DISCONTINUED | OUTPATIENT
Start: 2023-11-13 | End: 2023-11-17 | Stop reason: HOSPADM

## 2023-11-13 RX ORDER — METHADONE HYDROCHLORIDE 10 MG/ML
32 CONCENTRATE ORAL EVERY 4 HOURS PRN
Status: DISCONTINUED | OUTPATIENT
Start: 2023-11-13 | End: 2023-11-13

## 2023-11-13 RX ORDER — POTASSIUM CHLORIDE 750 MG/1
40 TABLET, FILM COATED, EXTENDED RELEASE ORAL
Status: COMPLETED | OUTPATIENT
Start: 2023-11-13 | End: 2023-11-13

## 2023-11-13 RX ORDER — HYDROXYZINE 50 MG/1
50 TABLET, FILM COATED ORAL 3 TIMES DAILY PRN
Status: DISCONTINUED | OUTPATIENT
Start: 2023-11-13 | End: 2023-11-17 | Stop reason: HOSPADM

## 2023-11-13 RX ORDER — METHADONE HYDROCHLORIDE 10 MG/5ML
32 SOLUTION ORAL DAILY
COMMUNITY

## 2023-11-13 RX ORDER — HALOPERIDOL 5 MG/ML
5 INJECTION INTRAMUSCULAR EVERY 4 HOURS PRN
Status: CANCELLED | OUTPATIENT
Start: 2023-11-13

## 2023-11-13 RX ORDER — POLYETHYLENE GLYCOL 3350 17 G/17G
17 POWDER, FOR SOLUTION ORAL DAILY PRN
Status: DISCONTINUED | OUTPATIENT
Start: 2023-11-13 | End: 2023-11-17 | Stop reason: HOSPADM

## 2023-11-13 RX ORDER — POLYETHYLENE GLYCOL 3350 17 G/17G
17 POWDER, FOR SOLUTION ORAL DAILY PRN
Status: CANCELLED | OUTPATIENT
Start: 2023-11-13

## 2023-11-13 RX ORDER — POTASSIUM CHLORIDE 7.45 MG/ML
10 INJECTION INTRAVENOUS
Status: COMPLETED | OUTPATIENT
Start: 2023-11-13 | End: 2023-11-13

## 2023-11-13 RX ORDER — LIDOCAINE HYDROCHLORIDE 20 MG/ML
5 SOLUTION OROPHARYNGEAL
Status: COMPLETED | OUTPATIENT
Start: 2023-11-13 | End: 2023-11-13

## 2023-11-13 RX ORDER — HYDROXYZINE 50 MG/1
50 TABLET, FILM COATED ORAL 3 TIMES DAILY PRN
Status: CANCELLED | OUTPATIENT
Start: 2023-11-13

## 2023-11-13 RX ORDER — HALOPERIDOL 5 MG/1
5 TABLET ORAL EVERY 4 HOURS PRN
Status: DISCONTINUED | OUTPATIENT
Start: 2023-11-13 | End: 2023-11-17 | Stop reason: HOSPADM

## 2023-11-13 RX ORDER — HALOPERIDOL 5 MG/1
5 TABLET ORAL EVERY 4 HOURS PRN
Status: CANCELLED | OUTPATIENT
Start: 2023-11-13

## 2023-11-13 RX ORDER — POLYETHYLENE GLYCOL 3350 17 G/17G
17 POWDER, FOR SOLUTION ORAL DAILY PRN
Status: DISCONTINUED | OUTPATIENT
Start: 2023-11-13 | End: 2023-11-13

## 2023-11-13 RX ORDER — KETOROLAC TROMETHAMINE 15 MG/ML
15 INJECTION, SOLUTION INTRAMUSCULAR; INTRAVENOUS
Status: COMPLETED | OUTPATIENT
Start: 2023-11-13 | End: 2023-11-13

## 2023-11-13 RX ORDER — DIPHENHYDRAMINE HYDROCHLORIDE 50 MG/ML
50 INJECTION INTRAMUSCULAR; INTRAVENOUS EVERY 4 HOURS PRN
Status: DISCONTINUED | OUTPATIENT
Start: 2023-11-13 | End: 2023-11-17 | Stop reason: HOSPADM

## 2023-11-13 RX ADMIN — HYDROXYZINE HYDROCHLORIDE 50 MG: 50 TABLET, FILM COATED ORAL at 21:26

## 2023-11-13 RX ADMIN — LIDOCAINE HYDROCHLORIDE 1000 MG: 10 INJECTION, SOLUTION EPIDURAL; INFILTRATION; INTRACAUDAL; PERINEURAL at 08:47

## 2023-11-13 RX ADMIN — METHADONE HYDROCHLORIDE 32 MG: 10 CONCENTRATE ORAL at 09:55

## 2023-11-13 RX ADMIN — POTASSIUM CHLORIDE 10 MEQ: 7.46 INJECTION, SOLUTION INTRAVENOUS at 03:23

## 2023-11-13 RX ADMIN — LIDOCAINE HYDROCHLORIDE 5 ML: 20 SOLUTION ORAL at 08:47

## 2023-11-13 RX ADMIN — POTASSIUM CHLORIDE 10 MEQ: 7.46 INJECTION, SOLUTION INTRAVENOUS at 07:12

## 2023-11-13 RX ADMIN — ONDANSETRON 4 MG: 4 TABLET, ORALLY DISINTEGRATING ORAL at 17:51

## 2023-11-13 RX ADMIN — ALUMINUM HYDROXIDE, MAGNESIUM HYDROXIDE, AND SIMETHICONE 30 ML: 200; 200; 20 SUSPENSION ORAL at 21:34

## 2023-11-13 RX ADMIN — KETOROLAC TROMETHAMINE 15 MG: 15 INJECTION, SOLUTION INTRAMUSCULAR; INTRAVENOUS at 03:44

## 2023-11-13 RX ADMIN — ACETAMINOPHEN 650 MG: 325 TABLET ORAL at 21:25

## 2023-11-13 RX ADMIN — POTASSIUM BICARBONATE 40 MEQ: 782 TABLET, EFFERVESCENT ORAL at 11:12

## 2023-11-13 RX ADMIN — POTASSIUM CHLORIDE 40 MEQ: 750 TABLET, FILM COATED, EXTENDED RELEASE ORAL at 02:54

## 2023-11-13 ASSESSMENT — SLEEP AND FATIGUE QUESTIONNAIRES
DO YOU HAVE DIFFICULTY SLEEPING: NO
DO YOU USE A SLEEP AID: NO
AVERAGE NUMBER OF SLEEP HOURS: 8

## 2023-11-13 ASSESSMENT — ENCOUNTER SYMPTOMS
GASTROINTESTINAL NEGATIVE: 1
RESPIRATORY NEGATIVE: 1

## 2023-11-13 ASSESSMENT — PAIN SCALES - GENERAL
PAINLEVEL_OUTOF10: 0
PAINLEVEL_OUTOF10: 7
PAINLEVEL_OUTOF10: 0

## 2023-11-13 ASSESSMENT — LIFESTYLE VARIABLES
HOW MANY STANDARD DRINKS CONTAINING ALCOHOL DO YOU HAVE ON A TYPICAL DAY: PATIENT DOES NOT DRINK
HOW OFTEN DO YOU HAVE A DRINK CONTAINING ALCOHOL: NEVER

## 2023-11-13 ASSESSMENT — PAIN DESCRIPTION - DESCRIPTORS: DESCRIPTORS: ACHING

## 2023-11-13 ASSESSMENT — PAIN DESCRIPTION - LOCATION: LOCATION: TEETH
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
## 2023-11-13 NOTE — BH NOTE
[id number] [facility] ADMISSION NOTE    Pt. Arrived on the unit at approx: [phone], escorted by [employer] and  [employer] via Via wheelchair. From [facility]  ER. Pt. Awake. Admission Type:   Admission Type: Involuntary    Reason for admission:   Reason for Admission: Paranoia      Addictive Behavior:   Addictive Behavior  In the Past 3 Months, Have You Felt or Has Someone Told You That You Have a Problem With  : None      Medical Problems:   Past Medical History:   Diagnosis Date    Chronic back pain     Depression     Pain management     Psychiatric disorder     PTSD, anxiety    Sciatica          Psych History:  Pt states she is tapering on Methadone and is now on 37 mg daily po. Has been using this to stop get off opioids that she used for chronic back pain. Pt states that she left her home in [facility] and did not let her  know where she was going. States he is very controlling so he alerted police and a ECO was issued on her. Pt was taken into custody her and a TDO was issued. Patient is, a smoker. If so, Nicotine patch ordered? Yes     Patient does not drink Alcohol. Patient does not, use Recreational substances or Street Drugs. Status EXAM:  Mental Status and Behavioral Exam  Normal: Yes  Level of Assistance: Independent/Self  Facial Expression: Worried  Affect: Appropriate  Level of Consciousness: Alert  Frequency of Checks: 4 times per hour, close  Mood:Normal: Yes  Motor Activity:Normal: Yes  Eye Contact: Good  Observed Behavior: Cooperative  Sexual Misconduct History: Current - no  Preception: Jewett to person, Jewett to time, Jewett to place, [place]ett to situation  Attention:Normal: Yes  Thought Processes: Unremarkable  Thought Content:Normal: Yes  Depression Symptoms: Sleep disturbance  Anxiety Symptoms: Generalized  Clare Symptoms: No problems reported or observed.   Hallucinations: None  Delusions: No  Memory:Normal: Yes  Insight and Judgment: Yes    Pt admitted with followings

## 2023-11-13 NOTE — ED NOTES
Patient states she does not want to remove valuables from inside her purse stating \" ain't really much in there just put the whole thing in there\"     Patient states that her phone, wallet containing debit cards and keys are in purse. Purse put into grey sealed bag and patient signed. At this time patient is changing into paper scrubs and yellow  socks. Security called to rashi patient.       Tiara Celis RN  11/13/23 9567
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
## 2023-11-13 NOTE — ED NOTES
Patient medications placed in secured bag. 4 methadone, 1 antacid, and 1 orajel. Bag signed and sealed. Bag containing medications placed in ER med room. Patient wanded by security and other miscellaneous belongings taken to security lock up.       Nate Arellano RN  11/13/23 3892

## 2023-11-13 NOTE — ED NOTES
[facility] dose [facility] on pt methadone.  Per CULLEN Culver LPN pt is taking 32 mg daily and is provided with 6 bottles to take home      Rosalino Stanley RN  11/13/23 2901

## 2023-11-13 NOTE — ED NOTES
[facility] [phone]     Pt  name is Jordan Chaves RN  11/13/23 8267

## 2023-11-14 LAB — POTASSIUM SERPL-SCNC: 3.6 MMOL/L (ref 3.5–5.1)

## 2023-11-14 PROCEDURE — 84132 ASSAY OF SERUM POTASSIUM: CPT

## 2023-11-14 PROCEDURE — 80061 LIPID PANEL: CPT

## 2023-11-14 PROCEDURE — 83036 HEMOGLOBIN GLYCOSYLATED A1C: CPT

## 2023-11-14 PROCEDURE — 6370000000 HC RX 637 (ALT 250 FOR IP): Performed by: PSYCHIATRY & NEUROLOGY

## 2023-11-14 PROCEDURE — 6370000000 HC RX 637 (ALT 250 FOR IP): Performed by: PHYSICIAN ASSISTANT

## 2023-11-14 PROCEDURE — 1240000000 HC EMOTIONAL WELLNESS R&B

## 2023-11-14 RX ORDER — PANTOPRAZOLE SODIUM 40 MG/1
40 FOR SUSPENSION ORAL
COMMUNITY

## 2023-11-14 RX ORDER — METHADONE HYDROCHLORIDE 10 MG/1
30 TABLET ORAL DAILY
Status: DISCONTINUED | OUTPATIENT
Start: 2023-11-14 | End: 2023-11-17 | Stop reason: HOSPADM

## 2023-11-14 RX ORDER — METHADONE HYDROCHLORIDE 10 MG/ML
32 CONCENTRATE ORAL EVERY MORNING
Status: DISCONTINUED | OUTPATIENT
Start: 2023-11-14 | End: 2023-11-14

## 2023-11-14 RX ORDER — LIDOCAINE 50 MG/G
1 PATCH TOPICAL DAILY
COMMUNITY

## 2023-11-14 RX ADMIN — ONDANSETRON 4 MG: 4 TABLET, ORALLY DISINTEGRATING ORAL at 08:12

## 2023-11-14 RX ADMIN — SERTRALINE 50 MG: 50 TABLET, FILM COATED ORAL at 08:13

## 2023-11-14 RX ADMIN — HYDROXYZINE HYDROCHLORIDE 50 MG: 50 TABLET, FILM COATED ORAL at 21:47

## 2023-11-14 RX ADMIN — ALUMINUM HYDROXIDE, MAGNESIUM HYDROXIDE, AND SIMETHICONE 30 ML: 200; 200; 20 SUSPENSION ORAL at 20:02

## 2023-11-14 RX ADMIN — METHADONE HYDROCHLORIDE 30 MG: 10 TABLET ORAL at 12:14

## 2023-11-14 ASSESSMENT — ENCOUNTER SYMPTOMS
GASTROINTESTINAL NEGATIVE: 1
ALLERGIC/IMMUNOLOGIC NEGATIVE: 1
RESPIRATORY NEGATIVE: 1
EYES NEGATIVE: 1

## 2023-11-14 ASSESSMENT — PAIN SCALES - GENERAL: PAINLEVEL_OUTOF10: 0

## 2023-11-14 NOTE — PLAN OF CARE
Problem: Discharge Planning  Goal: Discharge to home or other facility with appropriate resources  11/14/2023 0113 by Gene Mirza RN  Outcome: Progressing  11/13/2023 1439 by Janay Santana RN  Outcome: Progressing     Problem: Risk for Elopement  Goal: Patient will not exit the unit/facility without proper excort  11/14/2023 0113 by Gene Mirza RN  Outcome: Progressing  11/13/2023 1439 by Janay Santana RN  Outcome: Progressing     Problem: Depression  Goal: Will be euthymic at discharge  Description: INTERVENTIONS:  1. Administer medication as ordered  2. Provide emotional support via 1:1 interaction with staff  3. Encourage involvement in milieu/groups/activities  4. Monitor for social isolation  11/14/2023 0113 by Gene Mirza RN  Outcome: Progressing  11/13/2023 1439 by Janay Santana RN  Outcome: Progressing     Problem: Behavior  Goal: Pt/Family maintain appropriate behavior and adhere to behavioral management agreement, if implemented  Description: INTERVENTIONS:  1. Assess patient/family's coping skills and  non-compliant behavior (including use of illegal substances)  2. Notify security of behavior or suspected illegal substances which indicate the need for search of the family and/or belongings  3. Encourage verbalization of thoughts and concerns in a socially appropriate manner  4. Utilize positive, consistent limit setting strategies supporting safety of patient, staff and others  5. Encourage participation in the decision making process about the behavioral management agreement  6. If a visitor's behavior poses a threat to safety call refer to organization policy.   7. Initiate consult with , Psychosocial CNS, Spiritual Care as appropriate  11/14/2023 0113 by Gene Mirza RN  Outcome: Progressing  11/13/2023 1439 by Janay Santana RN  Outcome: Progressing     Problem: Anxiety  Goal: Will report anxiety at manageable levels  Description: INTERVENTIONS:  1. Administer medication as ordered  2. Teach and rehearse alternative coping skills  3. Provide emotional support with 1:1 interaction with staff  11/14/2023 0113 by Shilo Tidwell RN  Outcome: Progressing  11/13/2023 1439 by Pat Lewis RN  Outcome: Progressing     Problem: Involuntary Admit  Goal: Will cooperate with staff recommendations and doctor's orders and will demonstrate appropriate behavior  Description: INTERVENTIONS:  1. Treat underlying conditions and offer medication as ordered  2. Educate regarding involuntary admission procedures and rules  3.  Contain excessive/inappropriate behavior per unit and hospital policies  [id number] by Shilo Tidwell RN  Outcome: Progressing  11/13/2023 1439 by Pat Lewis RN  Outcome: Progressing

## 2023-11-14 NOTE — BH NOTE
BEHAVIORAL HEALTH GROUP NOTE FOR NON ATTENDEES        DATE: 11/14/2023      GROUP START: 0900    GROUP END: 0945          GROUP TYPE: Community Meeting        ATTENDANCE: Refused to participate          SIGNATURE:  Sheeba Becerril LPN

## 2023-11-14 NOTE — BH NOTE
B:   Patient alert and oriented x 3. Pt. States depression is 0. Pt. States Anxiety is 0. Pt. denies Hallucinations. Pt. denies Delusions. Pt. denies SI.  Pt. denies HI. Pt. cooperative with Assessment. Pt.'s behavior Guarded/Suspicious and Excessive Activity/Restless. I:    If patient is disoriented, reorient pt. Build trust with patient, by therapeutic listening and Groups. Encourage pt. To attend and Participate in Groups. Provide Medications as ordered and needed. Encourage pt. To be up for all meals and snacks, and consume all of each. Encourage pt. To interact with staff and peers in a positive manner. Encourage pt. To keep good hygiene. Q 15 minute safety checks. R:   Pt. did attend and Participate in Group. Pt. Is Compliant with Medications   Yes. Pt. is getting up for meals and snacks. Pt. Consumes 100% of Meals. Pt. is, interacting with Peers. Pt.'s hygiene is Good. Pt. does not, have any safety issues. P:   Pt. Will develop and continue to utilize positive Coping skills. Pt. Will continue to comply with Plan of Care toward Discharge. Pt. Will continue to stay safe on the unit. TREATMENT TEAM COMPLETED    Attending meeting was as follows:  Patient, BERE Easton, Writer, and Mena Miller. Meeting was conducted via telehealth. Daily Treatment Team consists of the following:     Pt. Cognitive status:  Awake    Pt. Attending Groups: Yes    Pt. Participating in groups:  Yes    Pt. Homicidal / Suicidal: tangential    Pt. Behaviors:  Cooperative and Pleasant    Pt.  Compliant with Medications:  Yes          New Medication orders:  Yes      Discharge Plan:  Home

## 2023-11-14 NOTE — CONSULTS
Hospitalist Consult Note             Chief Complaints:     Chief Complaint   Patient presents with    Psychiatric Evaluation         Subjective:     Melsisa Crews is a 44 y.o. female followed by None, None and  has a past medical history of Chronic back pain, Depression, Pain management, Psychiatric disorder, and Sciatica. Presents after  took E CO. It was reported that patient has been declining functioning over the past week not eating and having extremely paranoid. Patient was not forthcoming with any information just stated that her  is very jealous. Patient is on chronic methadone for back pain which she was following closely with the pain clinic which has been shut down. Methadone dose performed at 32 mg a day. The patient was referred for admission to the [facility] for further evaluation and treatment of noted paranoid type symptoms      Past Medical History:   Diagnosis Date    Chronic back pain     Depression     Pain management     Psychiatric disorder     PTSD, anxiety    Sciatica       Past Surgical History:   Procedure Laterality Date    BACK SURGERY      lumbar \"rods\"    LUMBAR FUSION      L4 L5 S1 with surgical implant     Family History   Problem Relation Age of Onset    Diabetes Mother     Hypertension Mother     Diabetes Sister     Diabetes Brother     Hypertension Father     Hypertension Brother       Social History     Tobacco Use    Smoking status: Every Day     Packs/day: .25     Types: Cigarettes    Smokeless tobacco: Never   Substance Use Topics    Alcohol use: Not Currently       Prior to Admission medications    Medication Sig Start Date End Date Taking? Authorizing Provider   lidocaine (LIDODERM) 5 % Place 1 patch onto the skin daily 12 hours on, 12 hours off.    Yes Kyara Garnett MD   pantoprazole sodium (PROTONIX) 40 MG PACK packet Take 1 packet by mouth every morning (before breakfast)   Yes Kyara Garnett MD   methadone 10 MG/5ML solution Imaging:  No results found.      Assessment & Plan:     Paranoid symptoms  -She states she has PTSD and depression anxiety and continue management per primary psychiatric team    Tobacco use  -Chronic smoker but doing well on current nicotine patch and continue to encourage cessation    Chronic opiate use  -Follows with methadone clinic and confirmed dosing of 32 mg daily        Thank you for allowing us to help care for your patient please call with any questions or concerns    55 minutes evaluating and cordinating patient's consult to behavioral health unit for medical and neurological evaluation requested by Dr. Karen Parmar MD        Electronically signed by Eli Hernandez MD on 11/14/2023 at 12:21 PM
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
## 2023-11-14 NOTE — BH NOTE
Writer attempted several times to contact regarding verification of methadone dose at [facility] at [phone]. Will continue to reach out.
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
## 2023-11-14 NOTE — BH NOTE
Pt received in bed resting, pt ate her dinner late  and she try to make phone call to  but no one answers. Pt attended wrap up group and rated depression as 1 and anxiety as 2 in      Upon assessment . Pt alert and oriented x 4 denies SI/HI, , Pt denies depressionor anxiety . Denies A/V  hallucinations. Pt  accepted HS medication Drank 8 oz water . pt c/o toothache given at 2125 po prn Tyelnol 650mg  for tootache 7/10 at first she accepted on tablet 325 mg saying tylenol will upset he stomach then accepted to take full dose when given Maalox 30ml at 2134 for upset stomach, bot helpful, pt start sleeping by 2155.  Remained sleeping  most night with waking interval to use bathroom, and awake at 0326 to check time and gave the Nicotine patch to staff saying it keep her waking up and have bad  dream, she went to back to her room      No violent no self harming behavior noticed or reported

## 2023-11-14 NOTE — BH NOTE
Pt remained sleeping as of this time.  Since 0345     No violent no self harming behavior noticed or reported

## 2023-11-14 NOTE — CARE COORDINATION
11/14/23 1315   ITP   Date of Plan 11/14/23   Date of Next Review 11/21/23   Primary Diagnosis Code MDD   Barriers to Treatment Client resistance; Need for psychoeducation;Psychiatric symptom (comment)   Strengths Incorporated in Plan Acknowledging need for assistance; Natural supports; Family supports;Postive outlook; Seeking interactions   Plan of Care   Long Term Goal (LTG) Stated in patient/guardian terms On PSA   Short Term Goal 1   Short Term Goal 1 Client will learn and demonstrate effective coping skills   Baseline Functioning Unknown   Target TBD   Objectives Client will participate in individual therapy;Client will participate in group therapy   Intervention 1 Acknowledge client strengths   Frequency Daily   Measured by Self report;Staff observation   Staff Responsible [facility] staff;Clinical staff   Intervention 2 Group therapy   Frequency Daily   Measured by Behavioral data; Self report;Staff observation   Staff Responsible Clinical staff;[facility] staff   Intervention 3 Referral to community services   Frequency Weekly   Measured by Behavioral data; Self report;Staff observation   Staff Responsible [facility] staff;Clinical staff   STG Goal 1 Status: Patient Appears to be  Partially meeting treatment plan goal   Short Term Goal 2   Short Term Goal 2 Client will maintain compliance with medication regime   Baseline Functioning Unknown   Target TBD   Objectives Client will participate in individual therapy;Client will participate in group therapy   Intervention 1 Monitor medications   Frequency Daily   Measured by Behavioral data; Self report;Staff observation   Staff Responsible [facility] staff   STG Goal 2 Status: Patient Appears to be  Partially meeting treatment plan goal   Crisis/Safety/Discharge Plan   Crisis/Safety Plan Standard program interventions and protocol   Comprehensive Assessment Completion Date 11/14/23   Discharge Plan Home with outpatient

## 2023-11-14 NOTE — H&P
INITIAL PSYCHIATRIC EVALUATION            IDENTIFICATION:    Patient Name  Shadia Arnold   Date of Birth 1980   [facility] [id number]   Medical Record Number  [id number]      Age  44 y.o. PCP None, None   Admit date:  11/12/2023    Room Number  105/02  @ [address] [facility]   Date of Service   Last Encounter w/Dept 11/14/2023  Visit date not found             HISTORY         REASON FOR HOSPITALIZATION:  CC: \"***\". Pt admitted under a ***temporary care home order (TDO) ***voluntary basis for ***severe depression with suicidal ideations ***severe psychosis ***and tony proving to be an imminent danger to self. HISTORY OF PRESENT ILLNESS:    The patient, Shadia Arnold, is a 44 y.o. Black / [place] American female with a past psychiatric history significant for ***, who presents at this time with complaints of *** . The symptoms are acute in nature and are of significant severity impacting daily life and function. The patient's condition has been precipitated by ***and psychosocial stressors. Patient's condition made worse by ***continued illicit drug use ***and alcohol use as well as ***treatment noncompliance. Otherwise mostly with depressive and anxiety symptoms. Endorses depressed mood, episodic tearfulness, anhedonia and feelings of guilt, hopelessness, helplessness and worthlessness. Energy is adequate. Denied any thoughts of self-harm or suicidal ideation. (Anxiety***) Denies any excessive worrying, social anxiety, panic attacks and OCD. (Irritability***) Denies low threshold of frustration or anger. (Bipolar***)Denies history of decreased need for sleep associated with increased energy, racing thoughts, rapid speech and risky behavior. (PTSD***) Denies history of verbal, physical or sexual abuse. Denies avoidant behavior related to trauma triggers, flashbacks, hypervigilance or nightmares.     (Psychosis***) Denies auditory/visual hallucinations or

## 2023-11-15 LAB
CHOLEST SERPL-MCNC: 78 MG/DL
EST. AVERAGE GLUCOSE BLD GHB EST-MCNC: 111 MG/DL
HBA1C MFR BLD: 5.5 % (ref 4–5.6)
HDLC SERPL-MCNC: 34 MG/DL
HDLC SERPL: 2.3 (ref 0–5)
LDLC SERPL CALC-MCNC: 29.8 MG/DL (ref 0–100)
LIPID PANEL: NORMAL
TRIGL SERPL-MCNC: 71 MG/DL
VLDLC SERPL CALC-MCNC: 14.2 MG/DL

## 2023-11-15 PROCEDURE — 6370000000 HC RX 637 (ALT 250 FOR IP): Performed by: PSYCHIATRY & NEUROLOGY

## 2023-11-15 PROCEDURE — 6370000000 HC RX 637 (ALT 250 FOR IP): Performed by: PHYSICIAN ASSISTANT

## 2023-11-15 PROCEDURE — 1240000000 HC EMOTIONAL WELLNESS R&B

## 2023-11-15 RX ADMIN — ONDANSETRON 4 MG: 4 TABLET, ORALLY DISINTEGRATING ORAL at 07:52

## 2023-11-15 RX ADMIN — METHADONE HYDROCHLORIDE 30 MG: 10 TABLET ORAL at 07:51

## 2023-11-15 RX ADMIN — ACETAMINOPHEN 650 MG: 325 TABLET ORAL at 20:47

## 2023-11-15 RX ADMIN — ALUMINUM HYDROXIDE, MAGNESIUM HYDROXIDE, AND SIMETHICONE 30 ML: 200; 200; 20 SUSPENSION ORAL at 12:15

## 2023-11-15 RX ADMIN — HYDROXYZINE HYDROCHLORIDE 50 MG: 50 TABLET, FILM COATED ORAL at 20:48

## 2023-11-15 RX ADMIN — POLYETHYLENE GLYCOL 3350 17 G: 17 POWDER, FOR SOLUTION ORAL at 21:58

## 2023-11-15 RX ADMIN — SERTRALINE 50 MG: 50 TABLET, FILM COATED ORAL at 07:52

## 2023-11-15 ASSESSMENT — PAIN SCALES - GENERAL
PAINLEVEL_OUTOF10: 0
PAINLEVEL_OUTOF10: 6
PAINLEVEL_OUTOF10: 0

## 2023-11-15 ASSESSMENT — PAIN DESCRIPTION - DESCRIPTORS: DESCRIPTORS: ACHING

## 2023-11-15 ASSESSMENT — PAIN DESCRIPTION - LOCATION: LOCATION: MOUTH;OTHER (COMMENT)

## 2023-11-15 ASSESSMENT — PAIN DESCRIPTION - ORIENTATION: ORIENTATION: LEFT;UPPER

## 2023-11-15 ASSESSMENT — PAIN - FUNCTIONAL ASSESSMENT
PAIN_FUNCTIONAL_ASSESSMENT: ACTIVITIES ARE NOT PREVENTED

## 2023-11-15 NOTE — BH NOTE
B:   Patient alert and oriented x 4. Pt. States depression is 0. Pt. States Anxiety is 0. Pt. denies Hallucinations. Pt. denies Delusions. Pt. denies SI.  Pt. denies HI. Pt. cooperative with Assessment. Pt.'s behavior Pleasant. I:    If patient is disoriented, reorient pt. Build trust with patient, by therapeutic listening and Groups. Encourage pt. To attend and Participate in Groups. Provide Medications as ordered and needed. Encourage pt. To be up for all meals and snacks, and consume all of each. Encourage pt. To interact with staff and peers in a positive manner. Encourage pt. To keep good hygiene. Q 15 minute safety checks. R:   Pt. did attend and Participate in Group. Pt. Is Compliant with Medications   Yes. Pt. is getting up for meals and snacks. Pt. Consumes 75% of Meals. Pt. is, interacting with Peers. Pt.'s hygiene is Fair. Pt. does, have any safety issues. P:   Pt. Will develop and continue to utilize positive Coping skills. Pt. Will continue to comply with Plan of Care toward Discharge. Pt. Will continue to stay safe on the unit.

## 2023-11-15 NOTE — BH NOTE
A commitment hearing was held with Jaja Hoffmann with pt represented by , Daniela Sorto. Pt was committed involuntarily for up to 15 days.

## 2023-11-15 NOTE — BH NOTE
BEHAVIORAL HEALTH GROUP NOTE FOR NON ATTENDEES        DATE: 11/15/2023      GROUP START: 10:00    GROUP END: 10:45          GROUP TYPE: Community Meeting        ATTENDANCE: Refused to participate          SIGNATURE:  Mai Mack

## 2023-11-15 NOTE — BH NOTE
B:   Patient alert and oriented x 3. Pt. States depression is 0. Pt. States Anxiety is 0. Pt. denies Hallucinations. Pt. denies Delusions. Pt. denies SI.  Pt. denies HI. Pt. cooperative with Assessment. Pt.'s behavior Guarded/Suspicious and Excessive Activity/Restless. I:    If patient is disoriented, reorient pt. Build trust with patient, by therapeutic listening and Groups. Encourage pt. To attend and Participate in Groups. Provide Medications as ordered and needed. Encourage pt. To be up for all meals and snacks, and consume all of each. Encourage pt. To interact with staff and peers in a positive manner. Encourage pt. To keep good hygiene. Q 15 minute safety checks. R:   Pt. did attend and Participate in Group. Pt. Is Compliant with Medications   Yes. Pt. is getting up for meals and snacks. Pt. Consumes 100% of Meals. Pt. is, interacting with Peers. Pt.'s hygiene is Good. Pt. does not, have any safety issues. P:   Pt. Will develop and continue to utilize positive Coping skills. Pt. Will continue to comply with Plan of Care toward Discharge. Pt. Will continue to stay safe on the unit. TREATMENT TEAM COMPLETED     Attending meeting was as follows:  Patient, BERE Root, Writer, and Antonio Krabbe, [place]. Meeting was conducted via telehealth. Daily Treatment Team consists of the following:      Pt. Cognitive status:  Awake     Pt. Attending Groups: Yes     Pt. Participating in groups:  Yes     Pt. Homicidal / Suicidal: tangential     Pt. Behaviors:  Defiant and angry, resigned and aloof     Pt. Compliant with Medications: Yes              New Medication orders:  Yes        Discharge Plan:  Home possibly with Aunt? Per pt, spouse isn't answering the phone. Patient was TDO x 15 days in court today and has been very upset all day.   She was encouraged to agree to stay voluntarily because she demonstrates capacity; however, pt perseverated on her situation and spoke within circumstances repeatedly. Pt currently remains angry.

## 2023-11-16 PROCEDURE — 6370000000 HC RX 637 (ALT 250 FOR IP): Performed by: PHYSICIAN ASSISTANT

## 2023-11-16 PROCEDURE — 6370000000 HC RX 637 (ALT 250 FOR IP): Performed by: PSYCHIATRY & NEUROLOGY

## 2023-11-16 PROCEDURE — 1240000000 HC EMOTIONAL WELLNESS R&B

## 2023-11-16 RX ADMIN — POLYETHYLENE GLYCOL 3350 17 G: 17 POWDER, FOR SOLUTION ORAL at 17:34

## 2023-11-16 RX ADMIN — METHADONE HYDROCHLORIDE 30 MG: 10 TABLET ORAL at 08:29

## 2023-11-16 RX ADMIN — ACETAMINOPHEN 650 MG: 325 TABLET ORAL at 19:23

## 2023-11-16 RX ADMIN — SERTRALINE 50 MG: 50 TABLET, FILM COATED ORAL at 08:29

## 2023-11-16 RX ADMIN — HYDROXYZINE HYDROCHLORIDE 50 MG: 50 TABLET, FILM COATED ORAL at 20:13

## 2023-11-16 ASSESSMENT — PAIN DESCRIPTION - LOCATION
LOCATION: MOUTH;TEETH
LOCATION: TEETH
LOCATION: MOUTH

## 2023-11-16 ASSESSMENT — PAIN SCALES - GENERAL
PAINLEVEL_OUTOF10: 6
PAINLEVEL_OUTOF10: 5
PAINLEVEL_OUTOF10: 6

## 2023-11-16 ASSESSMENT — PAIN - FUNCTIONAL ASSESSMENT: PAIN_FUNCTIONAL_ASSESSMENT: ACTIVITIES ARE NOT PREVENTED

## 2023-11-16 ASSESSMENT — PAIN DESCRIPTION - DESCRIPTORS
DESCRIPTORS: ACHING

## 2023-11-16 ASSESSMENT — PAIN DESCRIPTION - ORIENTATION
ORIENTATION: LEFT;UPPER
ORIENTATION: LEFT;UPPER

## 2023-11-16 NOTE — BH NOTE
B:   Patient alert and oriented x 4. Pt. States depression is 0. Pt. States Anxiety is 0. Pt. denies Hallucinations. Pt. denies Delusions. Pt. denies SI.  Pt. denies HI. Pt. cooperative with Assessment. Pt.'s behavior Anxious, Cooperative, and Pleasant. Pt spoke with  but wasn't willing to discuss with with him the situation because of him being so controlling. C/O upper left tooth pain 6/10. Tylenol 650mg po and Atarax 50mg po given. I:    If patient is disoriented, reorient pt. Build trust with patient, by therapeutic listening and Groups. Encourage pt. To attend and Participate in Groups. Provide Medications as ordered and needed. Encourage pt. To be up for all meals and snacks, and consume all of each. Encourage pt. To interact with staff and peers in a positive manner. Encourage pt. To keep good hygiene. Q 15 minute safety checks. R:   Pt. did attend and Participate in Group. Pt. Is Compliant with Medications   Yes. Pt. is getting up for meals and snacks. Pt. Consumes 75% of Meals. Pt. is, interacting with Peers. Pt.'s hygiene is Good. Pt. does not, have any safety issues. P:   Pt. Will develop and continue to utilize positive Coping skills. Pt. Will continue to comply with Plan of Care toward Discharge. Pt. Will continue to stay safe on the unit.     0600: pt slept throughout the night with no distress noted while staff conducted rounds.

## 2023-11-16 NOTE — BH NOTE
DISCHARGE SUMMARY    NAME:Barbara Dee  : 1980  MRN: [id number]    The patient Ross Early exhibits the ability to control behavior in a less restrictive environment. Patient's level of functioning is improving. No assaultive/destructive behavior has been observed for the past 24 hours. No suicidal/homicidal threat or behavior has been observed for the past 24 hours. There is no evidence of serious medication side effects. Patient has not been in physical or protective restraints for at least the past 24 hours. If weapons involved, how are they secured? N/A    Is patient aware of and in agreement with discharge plan? Yes    Arrangements for medication:  Prescriptions On file    Copy of discharge instructions to provider?:  Yes    Arrangements for transportation home:  Pt will be picked up by sister. Pt will be staying with family after discharge. Keep all follow up appointments as scheduled, continue to take prescribed medications per physician instructions.   Mental health crisis number:  553 or your local mental health crisis line number at [address] [facility] Emergency WARM LINE      8-773-735-MHAV ([phone])      M-F: 9am to 9pm      Sat & Sun: [address] [facility] suicide prevention lines:                             8-180-IVRELMB (3-879-308-0907)       4-963-487-TALK ([phone])    Crisis Text Line:  Text HOME to 841907

## 2023-11-16 NOTE — BH NOTE
B:   Patient alert and oriented x 4. Pt. States depression is 0. Pt. States Anxiety is 0. Pt. denies Hallucinations. Pt. denies Delusions. Pt. denies SI.  Pt. denies HI. Pt. cooperative with Assessment. Pt.'s behavior Withdrawn. I:    If patient is disoriented, reorient pt. Build trust with patient, by therapeutic listening and Groups. Encourage pt. To attend and Participate in Groups. Provide Medications as ordered and needed. Encourage pt. To be up for all meals and snacks, and consume all of each. Encourage pt. To interact with staff and peers in a positive manner. Encourage pt. To keep good hygiene. Q 15 minute safety checks. R:   Pt. did attend and Participate in Group. Pt. Is Compliant with Medications   Yes. Pt. is getting up for meals and snacks. Pt. Consumes 75% of Meals. Pt. is, interacting with Peers. Pt.'s hygiene is Good. Pt. does not, have any safety issues. P:   Pt. Will develop and continue to utilize positive Coping skills. Pt. Will continue to comply with Plan of Care toward Discharge. Pt. Will continue to stay safe on the unit.

## 2023-11-16 NOTE — BH NOTE
Received a phone call from patient's brother in law. Brother in law stated that he would have called before now but he knew something was wrong when he could not reach patient by phone. Stated that he and his wife have been calling patient's home and even talked to patient's  who stated that his wife needed to be in the hospital because he could not find her on the day she was admitted. Patient stated that her  wants all of the money she is going to get and her  only wants to give her some of the money when he feels she needs it. Brother in law states that he and his wife are willing to come get patient today and they live 1 and 1/2 hours away and they want patient to come live with them. Asked to speak to attending which is Mena Cohen. Message left with Ian Dale who returned phone call to brother in law. Patient will be discharged in the am with her sister and brother in law. Patient has her car at the local truck stop where the police picked her up. Patient states she was getting a restraining order against her  and wants to be legally  and . Patient states her husbands behavior changed in January where he has put a tracking device on her car and also has a way to monitor calls on her phone. Patient states that  is getting more and more possessive. Patient states that she is going to report all of the above to police.

## 2023-11-16 NOTE — BH NOTE
Behavioral Health Treatment Team Note     Patient goal(s) for today: Pt reports \"Start fresh when I leave here. \"  Treatment team focus/goals: medication management, safe discharge planning, attend groups and activities daily    Progress note:  Pt observed seated in the day room. Pt pleasant with this writer and inquired about signing a release for family members and safety planning. Pt alert and oriented x4. Pt denies SI, HI, AVH. Inpatient level of care needed in order to verify safe discharge with family and to stabilize pt further on medications. LOS:  3  Expected LOS: TDO up to 15 days    Insurance info/prescription coverage:  [employer] Medicare  Date of last family contact:  Fermin De La Paz and Kelli Garza spoke with pt's brother in law today. Pt granted permission. Family requesting physician contact today:  Yes  Discharge plan:  Home with outpatient   Guns in the home:  No  Outpatient provider(s):  Pt is possibly going to stay with family in [facility].  Outpatient will need to be re-established     Participating treatment team members: Miguelina Elder, [facility]

## 2023-11-16 NOTE — PLAN OF CARE
Problem: Discharge Planning  Goal: Discharge to home or other facility with appropriate resources  Outcome: Progressing     Problem: Risk for Elopement  Goal: Patient will not exit the unit/facility without proper excort  Outcome: Progressing     Problem: Depression  Goal: Will be euthymic at discharge  Description: INTERVENTIONS:  1. Administer medication as ordered  2. Provide emotional support via 1:1 interaction with staff  3. Encourage involvement in milieu/groups/activities  4. Monitor for social isolation  Outcome: Progressing     Problem: Behavior  Goal: Pt/Family maintain appropriate behavior and adhere to behavioral management agreement, if implemented  Description: INTERVENTIONS:  1. Assess patient/family's coping skills and  non-compliant behavior (including use of illegal substances)  2. Notify security of behavior or suspected illegal substances which indicate the need for search of the family and/or belongings  3. Encourage verbalization of thoughts and concerns in a socially appropriate manner  4. Utilize positive, consistent limit setting strategies supporting safety of patient, staff and others  5. Encourage participation in the decision making process about the behavioral management agreement  6. If a visitor's behavior poses a threat to safety call refer to organization policy. 7. Initiate consult with , Psychosocial CNS, Spiritual Care as appropriate  Outcome: Progressing     Problem: Anxiety  Goal: Will report anxiety at manageable levels  Description: INTERVENTIONS:  1. Administer medication as ordered  2. Teach and rehearse alternative coping skills  3. Provide emotional support with 1:1 interaction with staff  Outcome: Progressing     Problem: Involuntary Admit  Goal: Will cooperate with staff recommendations and doctor's orders and will demonstrate appropriate behavior  Description: INTERVENTIONS:  1. Treat underlying conditions and offer medication as ordered  2.  Educate

## 2023-11-17 VITALS
WEIGHT: 170 LBS | TEMPERATURE: 96.8 F | HEIGHT: 67 IN | OXYGEN SATURATION: 99 % | SYSTOLIC BLOOD PRESSURE: 114 MMHG | DIASTOLIC BLOOD PRESSURE: 70 MMHG | BODY MASS INDEX: 26.68 KG/M2 | HEART RATE: 86 BPM | RESPIRATION RATE: 16 BRPM

## 2023-11-17 PROBLEM — F29 PSYCHOSIS, UNSPECIFIED PSYCHOSIS TYPE (HCC): Status: RESOLVED | Noted: 2023-11-13 | Resolved: 2023-11-17

## 2023-11-17 PROBLEM — F43.10 PTSD (POST-TRAUMATIC STRESS DISORDER): Status: ACTIVE | Noted: 2023-11-17

## 2023-11-17 PROBLEM — F43.20 ADJUSTMENT DISORDER: Status: ACTIVE | Noted: 2023-11-17

## 2023-11-17 PROCEDURE — 6370000000 HC RX 637 (ALT 250 FOR IP): Performed by: PSYCHIATRY & NEUROLOGY

## 2023-11-17 PROCEDURE — 6370000000 HC RX 637 (ALT 250 FOR IP): Performed by: PHYSICIAN ASSISTANT

## 2023-11-17 RX ORDER — TRAZODONE HYDROCHLORIDE 50 MG/1
50 TABLET ORAL NIGHTLY PRN
Qty: 20 TABLET | Refills: 0 | Status: SHIPPED | OUTPATIENT
Start: 2023-11-17

## 2023-11-17 RX ADMIN — SERTRALINE 50 MG: 50 TABLET, FILM COATED ORAL at 08:13

## 2023-11-17 RX ADMIN — METHADONE HYDROCHLORIDE 30 MG: 10 TABLET ORAL at 08:12

## 2023-11-17 ASSESSMENT — PAIN SCALES - GENERAL: PAINLEVEL_OUTOF10: 0

## 2023-11-17 NOTE — GROUP NOTE
Group Therapy Note    Date: 11/13/2023    Group Start Time: 1350  Group End Time: 1430  Group Topic: Psychoeducation    [facility]    [facility], [address] [place] [address]        Group Therapy Note    Attendees: 2/5    Writer facilitated an inpatient psych-ed group regarding short term and long term goals as they relate to identity. Writer provided a handout and encouraged patients to process and reflect on various aspects of their identify and various goals. Writer held the space as patients processed. Writer concluded session with a grounding exercise and encouraging patients to provide input and feedback regarding the group. Patient's Goal:  To attend and participate in groups and activities daily. Notes:  Pt actively participated. Pt was able to identify goals and was able to identify various parts of herself she wants to work on. Status After Intervention:  Improved    Participation Level:  Active Listener and Interactive    Participation Quality: Appropriate, Attentive, Sharing, and Supportive      Speech:  normal      Thought Process/Content: Logical  Linear      Affective Functioning: Congruent      Mood: euthymic      Level of consciousness:  Alert, Oriented x4, and Attentive      Response to Learning: Able to verbalize/acknowledge new learning, Able to retain information, Capable of insight, and Progressing to goal      Endings: None Reported    Modes of Intervention: Education      Discipline Responsible: /Counselor      Signature:  Adelaida Alfaro [employer]
Group Therapy Note    Date: 11/13/2023    Group Start Time: 1430  Group End Time: 2320  Group Topic: Process Group - Inpatient    [facility] [place], [address] [place] [address]        Group Therapy Note    Attendees: 2/5    Writer facilitated an inpatient processing group. Writer encouraged patients to discuss discharge plans, express any feelings they may be having, etc. Writer implemented music and mindfulness therapeutic expressive arts activities. Writer held the space as patients processed. Writer concluded session with a grounding exercise and encouraging patients to ask questions. Patient's Goal:  To attend and participate in groups and activities daily. Notes:  Pt actively participated. Pt was able to discuss discharge plans and was able to identify coping strategies such as journaling and music. Status After Intervention:  Improved    Participation Level:  Active Listener and Interactive    Participation Quality: Appropriate, Attentive, Sharing, and Supportive      Speech:  normal      Thought Process/Content: Logical  Linear      Affective Functioning: Congruent      Mood: euthymic      Level of consciousness:  Alert, Oriented x4, and Attentive      Response to Learning: Able to verbalize/acknowledge new learning, Able to retain information, Capable of insight, and Progressing to goal      Endings: None Reported    Modes of Intervention: Exploration and Activity      Discipline Responsible: /Counselor      Signature:  [employer]
Group Therapy Note    Date: 11/13/2023    Group Start Time: 1500  Group End Time: 5184  Group Topic: Education Group - Inpatient    [facility] [address]     Naren Bernard RN        Group Therapy Note    Attendees: 5       Patient's Goal:  To learn some coping skills    Notes:  Pt states she writes in her journal and does deep breathing     Status After Intervention:  Improved    Participation Level: Interactive    Participation Quality: Appropriate      Speech:  normal      Thought Process/Content: Logical      Affective Functioning: Congruent      Mood:  Calm      Level of consciousness:  Alert      Response to Learning: Able to verbalize current knowledge/experience      Endings: None Reported    Modes of Intervention: Education      Discipline Responsible: Registered Nurse      Signature:  Naren Bernard RN
Group Therapy Note    Date: 11/13/2023    Group Start Time: 2000  Group End Time: 2045  Group Topic: Wrap-Up    [facility]    Demetraeca Josef A        Group Therapy Note    Attendees: 3         Patient's Goal:  none    Notes:  happy    Status After Intervention:  Improved    Participation Level:  Active Listener    Participation Quality: Supportive      Speech:  normal      Thought Process/Content: Logical      Affective Functioning: Congruent      Mood:  happy      Level of consciousness:  Alert      Response to Learning: Able to verbalize current knowledge/experience      Endings: None Reported    Modes of Intervention: Socialization      Discipline Responsible: Behavorial Health Tech      Signature:  Miroslava Suazo
Group Therapy Note    Date: 11/14/2023    Group Start Time: 1000  Group End Time: 0657  Group Topic: Activity    [facility] Mendy Mathew LPN        Group Therapy Note    Attendees: 5/5       Patient's Goal:  to get better    Notes:  Acknowledging other's feelings    Status After Intervention:  Unchanged    Participation Level: Minimal    Participation Quality: Appropriate      Speech:  normal      Thought Process/Content: Logical      Affective Functioning: Blunted      Mood: anxious and elevated      Level of consciousness:  Alert and Oriented x4      Response to Learning: Progressing to goal      Endings: None Reported    Modes of Intervention: Activity      Discipline Responsible: Licensed Practical Nurse      Signature:  Ni Stapleton LPN
Group Therapy Note    Date: 11/14/2023    Group Start Time: 1450  Group End Time: 6119  Group Topic: Process Group - Inpatient    [facility] [address], [address] [facility]        Group Therapy Note    Attendees: 3/5    Writer facilitated an inpatient processing group. Writer encouraged patients to engage in reflection regarding discharge plans and long term and short term goals discussed during yesterday's group. Writer implemented mindfulness and expressive arts. Writer encouraged patients to share any feelings they may be having, discuss discharge plans, etc. Writer held the space as patients processed. Writer concluded session with a grounding exercise. Patient's Goal:  To attend and participate in groups and activities daily    Notes:  Pt actively participated. Pt was able to engage in reflection and discussed discharge plans. Writer engaged in mindfulness. Status After Intervention:  Improved    Participation Level:  Active Listener and Interactive    Participation Quality: Appropriate, Attentive, Sharing, and Supportive      Speech:  normal      Thought Process/Content: Logical  Linear      Affective Functioning: Congruent      Mood: euthymic      Level of consciousness:  Alert, Oriented x4, and Attentive      Response to Learning: Able to verbalize/acknowledge new learning, Able to retain information, Capable of insight, and Progressing to goal      Endings: None Reported    Modes of Intervention: Exploration and Activity      Discipline Responsible: /Counselor      Signature:  Stephenie Mclaughlin [facility]
Group Therapy Note    Date: 11/14/2023    Group Start Time: 2000  Group End Time: 2045  Group Topic: Wrap-Up    [facility]    Michaelle PRIETO        Group Therapy Note    Attendees: 2       Patient's Goal:  none    Notes:  happy    Status After Intervention:  Improved    Participation Level:  Active Listener    Participation Quality: Supportive      Speech:  normal      Thought Process/Content: Logical      Affective Functioning: Congruent      Mood:  happy      Level of consciousness:  Alert      Response to Learning: Able to verbalize current knowledge/experience      Endings: None Reported    Modes of Intervention: Socialization      Discipline Responsible: Behavorial Health Tech      Signature:  Demi Smart
Group Therapy Note    Date: 11/14/2023    Group Start Time: 5224  Group End Time: 0419  Group Topic: Psychoeducation    [facility] [address]        Group Therapy Note    Attendees: 3/5    Writer facilitated an inpatient psych-ed group. Writer provided a CBT handout that discusses the concept of Perfectionism vs. Striving for Excellence. Therapeutic purpose of the discussion was to teach patients the difference between perfectionism and striving for excellence and various symptoms (anxiety, stress ,etc.) that excessive amounts of perfectionism can cause. Writer provided examples and encouraged patients to share and provide feedback. Patient's Goal:  To attend and participate in groups and activities daily. Notes:  Pt actively participated. Pt was able to discuss areas of reducing perfectionism she feels she needs to improve in. Status After Intervention:  Improved    Participation Level:  Active Listener and Interactive    Participation Quality: Appropriate, Attentive, Sharing, and Supportive      Speech:  normal      Thought Process/Content: Logical  Linear      Affective Functioning: Congruent      Mood: euthymic      Level of consciousness:  Alert, Oriented x4, and Attentive      Response to Learning: Able to verbalize/acknowledge new learning, Able to retain information, Capable of insight, and Progressing to goal      Endings: None Reported    Modes of Intervention: Education      Discipline Responsible: /Counselor      Signature:  [employer]
Group Therapy Note    Date: 11/15/2023    Group Start Time: 1000  Group End Time: 7262  Group Topic: Community Meeting    [facility] [place], [place]        Group Therapy Note    Attendees: 4         Patient's Goal:  ***    Notes:  ***    Status After Intervention:  {Status After Intervention:100258496}    Participation Level: {Participation Level:817130449}    Participation Quality: {[facility] PARTICIPATION QUALITY:126324253}      Speech:  {[facility] CD_SPEECH:76469}      Thought Process/Content: {Thought Process/Content:160725599}      Affective Functioning: {Affective Functionin}      Mood: {Mood:194279217}      Level of consciousness:  {Level of consciousness:869888958}      Response to Learnin [facility] Responses to Learnin}      Endings: {[facility] Endings:05297}    Modes of Intervention: {[facility] BHI Modes of Intervention:899837897}      Discipline Responsible: {[facility] Multidisciplinary:570392311}      Signature:  Martha Mendez
Group Therapy Note    Date: 11/15/2023    Group Start Time: 1100  Group End Time: 7042  Group Topic: Nursing    [place] [id number], Lissette Ge RN        Group Therapy Note    Attendees: 5/5       Patient's Goal:  Learning One's Own Recovery Path     Notes:  Step by step examples    Status After Intervention:  Improved    Participation Level:  Active Listener    Participation Quality: Appropriate      Speech:  normal      Thought Process/Content: Linear      Affective Functioning: Incongruent      Mood: angry      Level of consciousness:  Alert, Oriented x4, and Attentive      Response to Learning: Able to verbalize current knowledge/experience      Endings: None Reported    Modes of Intervention: Education, Clarifying, and Reality-testing      Discipline Responsible: Registered Nurse      Signature:  Osiel Rosales RN
Group Therapy Note    Date: 11/15/2023    Group Start Time: 1320  Group End Time: 1400  Group Topic: Psychoeducation    [facility] [address]  [place]        Group Therapy Note    Attendees: 4/5    Writer facilitated a psych-ed group regarding boundaries and the various types. Writer provided a handout. Writer reviewed the different types of boundaries such as porous, rigid, and healthy. Writer encouraged patients to discuss ways they feel they need to make improvements regarding working towards healthy boundaries. Writer concluded session with a grounding exercise. Patient's Goal:  To attend and participate in groups and activities daily. Notes:  Pt actively participated. Pt was able to share feelings regarding boundaries regarding her son and her own mother and discussed ways to improve setting healthy boundaries. Status After Intervention:  Improved    Participation Level:  Active Listener and Interactive    Participation Quality: Appropriate, Attentive, Sharing, and Supportive      Speech:  normal      Thought Process/Content: Logical  Linear      Affective Functioning: Congruent      Mood: euthymic      Level of consciousness:  Alert, Oriented x4, and Attentive      Response to Learning: Able to verbalize/acknowledge new learning, Able to retain information, Capable of insight, and Progressing to goal      Endings: None Reported    Modes of Intervention: Education      Discipline Responsible: /Counselor      Signature:  Adelaida Ruggiero [employer]
Group Therapy Note    Date: 11/15/2023    Group Start Time: 1400  Group End Time: 1431  Group Topic: Process Group - Inpatient    [facility] [address]  [facility]        Group Therapy Note    Attendees: 4/5    Writer facilitated an inpatient processing group. Writer encouraged patients to process any feeling they may be having and encouraged them to engage in a reflection exercise titled \"Personal Shield. \" Therapeutic purpose of the activity was for patients to identify protective factors in their life, discuss discharge plans, etc. Writer held the space and implemented mindfulness as patients processed. Writer concluded session with a grounding exercise. Patient's Goal:  To attend and participate in groups and activities daily. Notes:  Pt actively participated. Pt was able to discuss discharge plans and identified protective factors in her life. Status After Intervention:  Improved    Participation Level:  Active Listener and Interactive    Participation Quality: Appropriate, Attentive, Sharing, and Supportive      Speech:  normal      Thought Process/Content: Logical  Linear      Affective Functioning: Congruent      Mood: euthymic      Level of consciousness:  Alert, Oriented x4, and Attentive      Response to Learning: Able to retain information, Capable of insight, and Progressing to goal      Endings: None Reported    Modes of Intervention: Exploration and Activity      Discipline Responsible: /Counselor      Signature:  [employer]ilda [employer]
Group Therapy Note    Date: 11/15/2023    Group Start Time: 2000  Group End Time: 2045  Group Topic: Wrap-Up    [facility]    Kenia Ingram CNA        Group Therapy Note    Attendees: 5       Patient's Goal:  none    Notes:  participated in group    Status After Intervention:  Improved    Participation Level: Active Listener and Interactive    Participation Quality: Appropriate and Attentive      Speech:  normal      Thought Process/Content: Logical      Affective Functioning: Congruent      Mood: depressed      Level of consciousness:  Alert and Oriented x4      Response to Learning: Able to verbalize current knowledge/experience, Able to verbalize/acknowledge new learning, Able to retain information, Capable of insight, Able to change behavior, and Progressing to goal      Endings: None Reported    Modes of Intervention: Activity      Discipline Responsible: Behavorial Health Tech      Signature:   Kenia Ingram CNA
Group Therapy Note    Date: 11/16/2023    Group Start Time: 1000  Group End Time: 0194  Group Topic: Community Meeting    [facility] [place]        Group Therapy Note    Attendees: 4       Patient's Goal:  To Go Home    Notes:      Status After Intervention:  Improved    Participation Level:  Active Listener    Participation Quality: Appropriate      Speech:  normal      Thought Process/Content: Logical      Affective Functioning: Congruent      Mood: angry      Level of consciousness:  Alert      Response to Learning: Able to verbalize current knowledge/experience      Endings: None Reported    Modes of Intervention: Support      Discipline Responsible: [address] [facility]      Signature:  Maria Esther Koenig
Group Therapy Note    Date: 11/16/2023    Group Start Time: 1330  Group End Time: 3344  Group Topic: Process Group - Inpatient    [facility] [address], [address] [facility]        Group Therapy Note    Attendees: 3/4    Writer facilitated an inpatient processing group. Writer implemented therapeutic expressive arts activities and encouraged patients to process any feelings they may be having, discuss discharge plans, etc. Writer held the space as patients processed. Writer concluded session with a grounding exercise. Patient's Goal:  To attend and participate in groups and activities daily. Notes:  Pt actively participated. Pt was able to discuss discharge plans and engaged in expressive arts. Status After Intervention:  Improved    Participation Level:  Active Listener and Interactive    Participation Quality: Appropriate, attentive       Speech:  normal      Thought Process/Content: Logical  Linear      Affective Functioning: Congruent      Mood: euthymic      Level of consciousness:  Alert, Oriented x4, and Attentive      Response to Learning: Able to retain information, Capable of insight, and Progressing to goal      Endings: None Reported    Modes of Intervention: Exploration and Activity      Discipline Responsible: /Counselor      Signature:  Adelaida Rodríguez
Group Therapy Note    Date: 11/16/2023    Group Start Time: 1410  Group End Time: 1500  Group Topic: Psychoeducation    [facility] [address]        Group Therapy Note    Attendees: 3/4    Writer facilitated an inpatient psych-ed group regarding a DBT concept of the \"wise mind\" as it relates to emotional regulation. Writer provided a handout regarding various emotional states such as the emotional, reasonable, and wise mind. Writer encouraged patients to engage and reflect on areas they can improve in regarding emotional regulation based on the concept. Writer concluded session with a grounding exercise and encouraging patients to provide input and feedback regarding the group. Patient's Goal:  To attend and participate in groups and activities daily. Notes:  Pt actively participated. Pt was able to gain significant insight regarding emotional regulation and areas she needs to improve in. Status After Intervention:  Improved    Participation Level:  Active Listener and Interactive    Participation Quality: Appropriate, Attentive, Sharing, and Supportive      Speech:  normal      Thought Process/Content: Logical  Linear      Affective Functioning: Congruent      Mood: euthymic      Level of consciousness:  Alert, Oriented x4, and Attentive      Response to Learning: Able to verbalize/acknowledge new learning, Able to retain information, Capable of insight, and Progressing to goal      Endings: None Reported    Modes of Intervention: Education      Discipline Responsible: /Counselor      Signature:  Adelaida Cruz
Group Therapy Note    Date: 11/16/2023    Group Start Time: 2000  Group End Time: 2045  Group Topic: Wrap-Up    [facility]    Aimee Black CNA        Group Therapy Note    Attendees: 4       Patient's Goal:  none     Notes:  participated in group    Status After Intervention:  Improved    Participation Level: Active Listener and Interactive    Participation Quality: Appropriate and Attentive      Speech:  normal      Thought Process/Content: Logical      Affective Functioning: Congruent      Mood: anxious      Level of consciousness:  Alert and Oriented x4      Response to Learning: Able to verbalize current knowledge/experience, Able to verbalize/acknowledge new learning, Able to retain information, Capable of insight, Able to change behavior, and Progressing to goal      Endings: None Reported    Modes of Intervention: Activity      Discipline Responsible: Behavorial Health Tech      Signature:   Aimee Black CNA
No

## 2023-11-17 NOTE — BH NOTE
Discharge instructions given and explained,  States understanding. Belongings returned. Denies SI/HI. Has safety plan in place. Discharged with family via private vehicle.

## 2023-11-17 NOTE — BH NOTE
B:   Patient alert and oriented x 4. Pt. States depression is 0. Pt. States Anxiety is 0. Pt. denies Hallucinations. Pt. denies Delusions. Pt. denies SI.  Pt. denies HI. Pt. cooperative with Assessment. Pt.'s behavior Cooperative, and Pleasant. Pt C/O upper left tooth pain 6/10. Tylenol 650mg po and Atarax 50mg po given. Pt  called around 200 requesting information when his wife was going discharging and if she released her, advised her  that I couldn't release any information to him at this time. I:    If patient is disoriented, reorient pt. Build trust with patient, by therapeutic listening and Groups. Encourage pt. To attend and Participate in Groups. Provide Medications as ordered and needed. Encourage pt. To be up for all meals and snacks, and consume all of each. Encourage pt. To interact with staff and peers in a positive manner. Encourage pt. To keep good hygiene. Q 15 minute safety checks. R:   Pt. did attend and Participate in Group. Pt. Is Compliant with Medications   Yes. Pt. is getting up for meals and snacks. Pt. Consumes 75% of Meals. Pt. is, interacting with Peers and staff. Pt.'s hygiene is Good. Pt. does not, have any safety issues. P:   Pt. Will develop and continue to utilize positive Coping skills. Pt. Will continue to comply with Plan of Care toward Discharge. Pt. Will continue to stay safe on the unit.      0600: pt slept throughout the night with no distress noted while staff conducted rounds.

## 2023-11-17 NOTE — PROGRESS NOTES
B: Pt is awake and alert, states she is doing well. Denies suicidal and homicidal thoughts, states her depression and anxiety is better. Eating and sleeping well, compliant with meds  Working on coping skills. Ready for discharge today. Safe on unit. I: Maintain a safe environment for pt, safety cks q 15 mins and prn. Give meds as ordered, encourage coping skills. R: Pt is cooperative with assessment and discharge instructions. States she will stay on her meds and follow up with her therapist and the [place]. Safe on unit. P: Continue to monitor, give meds as ordered, Discharge instructions.

## 2023-11-17 NOTE — DISCHARGE SUMMARY
MEDICATIONS:     Informed consent given for the use of following psychotropic medications:     Medication List        START taking these medications      sertraline 50 MG tablet  Commonly known as: ZOLOFT  Take 1 tablet by mouth daily     traZODone 50 MG tablet  Commonly known as: DESYREL  Take 1 tablet by mouth nightly as needed for Sleep            CONTINUE taking these medications      lidocaine 5 %  Commonly known as: LIDODERM     methadone 10 MG/5ML solution     pantoprazole sodium 40 MG Pack packet  Commonly known as: PROTONIX               Where to Get Your Medications        These medications were sent to [facility], [facility] [address], [address] [facility]      Phone: [phone]   sertraline 50 MG tablet  traZODone 50 MG tablet                A coordinated, multidisplinary treatment team meeting was conducted with Sher Mac at [facility]. This team consists of the nurse,  and staff psychiatric provider. I certify that this patients inpatient psychiatric hospital services furnished since the previous certification were, and continue to be, required for treatment that could reasonably be expected to improve the patient's condition, or for diagnostic study, and that the patient continues to need, on a daily basis, active treatment furnished directly by or requiring the supervision of inpatient psychiatric facility personnel. In addition, the hospital records show that services furnished were intensive treatment services, admission or related services, or equivalent services. I have spent greater than 35 minutes on discharge work.     Signed:  Rolando Huston, PhD, PA, [address] [place] [address] Psychiatric  [phone]  11/17/2023

## 2023-11-17 NOTE — BH NOTE
Pt. Belongings given back to pt., verified all there, signed for. Discharge instructions explained to pt. Including, Follow up appt. With [facility] . Medications called into [facility]. Pt. Denies SI/HI at time of discharge. Pt. is a Smoker. Smoking cessation education wasprovided. Pt. is not a drinker. Alcohol Education was not Provided. Discharge Paperwork and H & P, faxed to [facility], With success sheet. Pt. was not discharged on 2 or more Antipsychotics. Pt. Displayed no safety concerns at discharge. Pt. Escorted to front by staff for transportation by [facility] via car, to home.

## 2023-11-17 NOTE — BH NOTE
TREATMENT TEAM COMPLETED     Attending meeting was as follows:  Patient, KATERINA Zuluaga, Isabel Li, MARIA DEL CARMEN Unit Manager, Sigifredo Stroud LPN and Jean-Claude Moser Licensed Clinical Therapist.       Meeting was conducted via       Daily Treatment Team consists of the following:     Pt. Cognitive status:  Alert and Oriented x 4    Pt. Attending Groups:Yes    Pt. Participating in groups:  Yes    Pt. Homicidal / Suicidal:   Denies    Pt. Behaviors:  Pleasant anc Cooperative. Plan for Discharge in the am.     Pt. Compliant with Medications:  Yes          New Medication orders: No      Discharge Plan:   Patient will return to her sister's home in [place] and will have services set up with the [facility] in [place].

## 2023-11-20 NOTE — BH NOTE
Follow up call: Writer attempted follow up call. Unable to leave a voice message due to mailbox being full.

## 2023-11-26 ENCOUNTER — HOSPITAL ENCOUNTER (EMERGENCY)
Facility: HOSPITAL | Age: 43
Discharge: HOME | End: 2023-11-26
Attending: EMERGENCY MEDICINE
Payer: MEDICARE

## 2023-11-26 ENCOUNTER — APPOINTMENT (OUTPATIENT)
Facility: HOSPITAL | Age: 43
End: 2023-11-26
Payer: MEDICARE

## 2023-11-26 VITALS
DIASTOLIC BLOOD PRESSURE: 78 MMHG | SYSTOLIC BLOOD PRESSURE: 115 MMHG | HEIGHT: 66 IN | WEIGHT: 160 LBS | RESPIRATION RATE: 18 BRPM | HEART RATE: 90 BPM | TEMPERATURE: 99 F | OXYGEN SATURATION: 100 % | BODY MASS INDEX: 25.71 KG/M2

## 2023-11-26 DIAGNOSIS — F41.1 ANXIETY STATE: ICD-10-CM

## 2023-11-26 DIAGNOSIS — K08.89 DENTALGIA: ICD-10-CM

## 2023-11-26 DIAGNOSIS — U07.1 COVID: Primary | ICD-10-CM

## 2023-11-26 LAB
ALBUMIN SERPL-MCNC: 3.8 G/DL (ref 3.5–5)
ALBUMIN/GLOB SERPL: 1 (ref 1.1–2.2)
ALP SERPL-CCNC: 73 U/L (ref 45–117)
ALT SERPL-CCNC: 18 U/L (ref 12–78)
ANION GAP BLD CALC-SCNC: 8
ANION GAP SERPL CALC-SCNC: 8 MMOL/L (ref 5–15)
APPEARANCE UR: ABNORMAL
AST SERPL W P-5'-P-CCNC: ABNORMAL U/L (ref 15–37)
BACTERIA URNS QL MICRO: NEGATIVE /HPF
BASOPHILS # BLD: 0 K/UL (ref 0–0.1)
BASOPHILS NFR BLD: 0 % (ref 0–1)
BILIRUB SERPL-MCNC: 0.4 MG/DL (ref 0.2–1)
BILIRUB UR QL: NEGATIVE
BUN SERPL-MCNC: 6 MG/DL (ref 6–20)
BUN/CREAT SERPL: 7 (ref 12–20)
CA-I BLD-MCNC: 0.94 MMOL/L (ref 1.12–1.32)
CA-I BLD-MCNC: 9.1 MG/DL (ref 8.5–10.1)
CHLORIDE BLD-SCNC: 106 MMOL/L (ref 98–107)
CHLORIDE SERPL-SCNC: 107 MMOL/L (ref 97–108)
CO2 BLD-SCNC: 21 MMOL/L
CO2 SERPL-SCNC: 23 MMOL/L (ref 21–32)
COLOR UR: ABNORMAL
CREAT SERPL-MCNC: 0.89 MG/DL (ref 0.55–1.02)
CREAT UR-MCNC: 0.64 MG/DL (ref 0.6–1.3)
DIFFERENTIAL METHOD BLD: ABNORMAL
EOSINOPHIL # BLD: 0 K/UL (ref 0–0.4)
EOSINOPHIL NFR BLD: 0 % (ref 0–7)
EPITH CASTS URNS QL MICRO: ABNORMAL /LPF
ERYTHROCYTE [DISTWIDTH] IN BLOOD BY AUTOMATED COUNT: 13.6 % (ref 11.5–14.5)
FLUAV AG NPH QL IA: NEGATIVE
FLUBV AG NOSE QL IA: NEGATIVE
GLOBULIN SER CALC-MCNC: 3.9 G/DL (ref 2–4)
GLUCOSE BLD STRIP.AUTO-MCNC: 96 MG/DL (ref 65–100)
GLUCOSE SERPL-MCNC: 99 MG/DL (ref 65–100)
GLUCOSE UR STRIP.AUTO-MCNC: NEGATIVE MG/DL
HCG SERPL QL: NEGATIVE
HCT VFR BLD AUTO: 35 % (ref 35–47)
HGB BLD-MCNC: 11.9 G/DL (ref 11.5–16)
HGB UR QL STRIP: ABNORMAL
IMM GRANULOCYTES # BLD AUTO: 0 K/UL (ref 0–0.04)
IMM GRANULOCYTES NFR BLD AUTO: 0 % (ref 0–0.5)
KETONES UR QL STRIP.AUTO: 20 MG/DL
LACTATE BLD-SCNC: 1.32 MMOL/L (ref 0.4–2)
LEUKOCYTE ESTERASE UR QL STRIP.AUTO: ABNORMAL
LYMPHOCYTES # BLD: 1.8 K/UL (ref 0.8–3.5)
LYMPHOCYTES NFR BLD: 11 % (ref 12–49)
MCH RBC QN AUTO: 22.3 PG (ref 26–34)
MCHC RBC AUTO-ENTMCNC: 34 G/DL (ref 30–36.5)
MCV RBC AUTO: 65.5 FL (ref 80–99)
MONOCYTES # BLD: 0.7 K/UL (ref 0–1)
MONOCYTES NFR BLD: 4 % (ref 5–13)
MUCOUS THREADS URNS QL MICRO: ABNORMAL /LPF
NEUTS SEG # BLD: 13.8 K/UL (ref 1.8–8)
NEUTS SEG NFR BLD: 85 % (ref 32–75)
NITRITE UR QL STRIP.AUTO: NEGATIVE
NRBC # BLD: 0 K/UL (ref 0–0.01)
NRBC BLD-RTO: 0 PER 100 WBC
PERFORMED BY:: ABNORMAL
PH UR STRIP: 7 (ref 5–8)
PLATELET # BLD AUTO: 266 K/UL (ref 150–400)
PMV BLD AUTO: 10.9 FL (ref 8.9–12.9)
POTASSIUM BLD-SCNC: 7.6 MMOL/L (ref 3.5–5.5)
POTASSIUM SERPL-SCNC: ABNORMAL MMOL/L (ref 3.5–5.1)
PROCALCITONIN SERPL-MCNC: <0.05 NG/ML
PROT SERPL-MCNC: 7.7 G/DL (ref 6.4–8.2)
PROT UR STRIP-MCNC: 100 MG/DL
RBC # BLD AUTO: 5.34 M/UL (ref 3.8–5.2)
RBC #/AREA URNS HPF: >100 /HPF (ref 0–5)
RBC MORPH BLD: ABNORMAL
SARS-COV-2 RDRP RESP QL NAA+PROBE: DETECTED
SERVICE CMNT-IMP: ABNORMAL
SODIUM BLD-SCNC: 134 MMOL/L (ref 136–145)
SODIUM SERPL-SCNC: 138 MMOL/L (ref 136–145)
SP GR UR REFRACTOMETRY: 1.02 (ref 1–1.03)
SPECIMEN SITE: ABNORMAL
TROPONIN I SERPL HS-MCNC: 7 NG/L (ref 0–51)
URINE CULTURE IF INDICATED: ABNORMAL
UROBILINOGEN UR QL STRIP.AUTO: 0.1 EU/DL (ref 0.1–1)
WBC # BLD AUTO: 16.3 K/UL (ref 3.6–11)
WBC URNS QL MICRO: ABNORMAL /HPF (ref 0–4)

## 2023-11-26 PROCEDURE — 71045 X-RAY EXAM CHEST 1 VIEW: CPT

## 2023-11-26 PROCEDURE — 81001 URINALYSIS AUTO W/SCOPE: CPT

## 2023-11-26 PROCEDURE — 36415 COLL VENOUS BLD VENIPUNCTURE: CPT

## 2023-11-26 PROCEDURE — 84703 CHORIONIC GONADOTROPIN ASSAY: CPT

## 2023-11-26 PROCEDURE — 84484 ASSAY OF TROPONIN QUANT: CPT

## 2023-11-26 PROCEDURE — 85025 COMPLETE CBC W/AUTO DIFF WBC: CPT

## 2023-11-26 PROCEDURE — 80053 COMPREHEN METABOLIC PANEL: CPT

## 2023-11-26 PROCEDURE — 99285 EMERGENCY DEPT VISIT HI MDM: CPT

## 2023-11-26 PROCEDURE — 87635 SARS-COV-2 COVID-19 AMP PRB: CPT

## 2023-11-26 PROCEDURE — 87040 BLOOD CULTURE FOR BACTERIA: CPT

## 2023-11-26 PROCEDURE — 6370000000 HC RX 637 (ALT 250 FOR IP): Performed by: EMERGENCY MEDICINE

## 2023-11-26 PROCEDURE — 83605 ASSAY OF LACTIC ACID: CPT

## 2023-11-26 PROCEDURE — 80047 BASIC METABLC PNL IONIZED CA: CPT

## 2023-11-26 PROCEDURE — 2580000003 HC RX 258: Performed by: EMERGENCY MEDICINE

## 2023-11-26 PROCEDURE — 96374 THER/PROPH/DIAG INJ IV PUSH: CPT

## 2023-11-26 PROCEDURE — 87804 INFLUENZA ASSAY W/OPTIC: CPT

## 2023-11-26 PROCEDURE — 96361 HYDRATE IV INFUSION ADD-ON: CPT

## 2023-11-26 PROCEDURE — 93005 ELECTROCARDIOGRAM TRACING: CPT | Performed by: EMERGENCY MEDICINE

## 2023-11-26 PROCEDURE — 84145 PROCALCITONIN (PCT): CPT

## 2023-11-26 PROCEDURE — 6360000002 HC RX W HCPCS: Performed by: EMERGENCY MEDICINE

## 2023-11-26 RX ORDER — KETOROLAC TROMETHAMINE 15 MG/ML
15 INJECTION, SOLUTION INTRAMUSCULAR; INTRAVENOUS ONCE
Status: COMPLETED | OUTPATIENT
Start: 2023-11-26 | End: 2023-11-26

## 2023-11-26 RX ORDER — HYDROXYZINE HYDROCHLORIDE 25 MG/1
25 TABLET, FILM COATED ORAL
Status: COMPLETED | OUTPATIENT
Start: 2023-11-26 | End: 2023-11-26

## 2023-11-26 RX ORDER — 0.9 % SODIUM CHLORIDE 0.9 %
30 INTRAVENOUS SOLUTION INTRAVENOUS ONCE
Status: COMPLETED | OUTPATIENT
Start: 2023-11-26 | End: 2023-11-26

## 2023-11-26 RX ORDER — CLINDAMYCIN HYDROCHLORIDE 150 MG/1
CAPSULE ORAL
COMMUNITY
Start: 2023-11-24

## 2023-11-26 RX ORDER — ACETAMINOPHEN 500 MG
1000 TABLET ORAL
Status: COMPLETED | OUTPATIENT
Start: 2023-11-26 | End: 2023-11-26

## 2023-11-26 RX ADMIN — ACETAMINOPHEN 1000 MG: 500 TABLET ORAL at 18:49

## 2023-11-26 RX ADMIN — HYDROXYZINE HYDROCHLORIDE 25 MG: 25 TABLET, FILM COATED ORAL at 18:49

## 2023-11-26 RX ADMIN — KETOROLAC TROMETHAMINE 15 MG: 15 INJECTION, SOLUTION INTRAMUSCULAR; INTRAVENOUS at 18:49

## 2023-11-26 RX ADMIN — SODIUM CHLORIDE 2178 ML: 9 INJECTION, SOLUTION INTRAVENOUS at 18:50

## 2023-11-26 ASSESSMENT — PAIN SCALES - GENERAL: PAINLEVEL_OUTOF10: 6

## 2023-11-26 ASSESSMENT — PAIN - FUNCTIONAL ASSESSMENT: PAIN_FUNCTIONAL_ASSESSMENT: 0-10

## 2023-11-26 NOTE — ED TRIAGE NOTES
Present for \"really bad panic attack\"  C/O Tooth pain, CP, anxiety  States \"I have really bad PTSD\"

## 2023-11-27 ENCOUNTER — HOSPITAL ENCOUNTER (EMERGENCY)
Facility: HOSPITAL | Age: 43
Discharge: HOME | End: 2023-11-27
Attending: STUDENT IN AN ORGANIZED HEALTH CARE EDUCATION/TRAINING PROGRAM
Payer: MEDICARE

## 2023-11-27 DIAGNOSIS — U07.1 COVID-19: Primary | ICD-10-CM

## 2023-11-27 DIAGNOSIS — Z76.5 MALINGERING: ICD-10-CM

## 2023-11-27 LAB
EKG ATRIAL RATE: 122 BPM
EKG DIAGNOSIS: NORMAL
EKG P AXIS: 66 DEGREES
EKG P-R INTERVAL: 134 MS
EKG Q-T INTERVAL: 306 MS
EKG QRS DURATION: 68 MS
EKG QTC CALCULATION (BAZETT): 436 MS
EKG R AXIS: 69 DEGREES
EKG T AXIS: 72 DEGREES
EKG VENTRICULAR RATE: 122 BPM

## 2023-11-27 PROCEDURE — 99285 EMERGENCY DEPT VISIT HI MDM: CPT

## 2023-11-27 NOTE — ED TRIAGE NOTES
Pt presents to the ED with c/o \"being crazy\" pt was just d/c from the ED a few hours ago dx with covid, pt states that she is crazy and talking to herself. Pt appears to be behavioral and can redirect her anger when she needs to.   Ambulatory, non labored breathing, NAD

## 2023-11-27 NOTE — ED NOTES
Nelly Clements from lab called to inform of patient's positive COVID 19 test.  Provider notified       Konstantin Stack RN  38/57/94 [id number]

## 2023-11-27 NOTE — ED PROVIDER NOTES
[facility] EMERGENCY DEPT  EMERGENCY DEPARTMENT HISTORY AND PHYSICAL EXAM      Date: 11/27/2023  Patient Name: Emmanuel Christian  MRN: [id number]  [address] [place] 1980  Date of evaluation: 11/27/2023  Provider: Sandie Coto MD   Note Started: 3:14 AM EST 11/27/23    HISTORY OF PRESENT ILLNESS     Chief Complaint   Patient presents with    Mental Health Problem       History Provided By: Patient      HPI: Emmanuel Christian is a 44 y.o. female with pmhx as reviewed below and recent diagnosis of covid presents for evaluation stating that she needs a place to stay until 8am. Patient diagnosed with covid earlier this evening and states that because of this diagnosis, her family doesn't want to give her a ride home. She states that she is stressed due to family conflicts. She initially deies SI or HI but when told that we would not be able to have [facility] see her based on these criteria, she states \"well then I want to kill my  and I want something to eat\"    PAST MEDICAL HISTORY   Past Medical History:  Past Medical History:   Diagnosis Date    Chronic back pain     Depression     Pain management     Psychiatric disorder     PTSD, anxiety    Sciatica        Past Surgical History:  Past Surgical History:   Procedure Laterality Date    BACK SURGERY      lumbar \"rods\"    LUMBAR FUSION      L4 L5 S1 with surgical implant       Family History:  Family History   Problem Relation Age of Onset    Diabetes Mother     Hypertension Mother     Diabetes Sister     Diabetes Brother     Hypertension Father     Hypertension Brother        Social History:  Social History     Tobacco Use    Smoking status: Every Day     Packs/day: .25     Types: Cigarettes    Smokeless tobacco: Never   Vaping Use    Vaping Use: Never used   Substance Use Topics    Alcohol use: Not Currently    Drug use: Yes     Types: Marijuana Óscar Puentes)     Comment: Has medical card       Allergies:   Allergies   Allergen Reactions    Metoclopramide Palpitations,
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
## 2023-11-27 NOTE — DISCHARGE INSTRUCTIONS
Basophils Absolute 0.0 0.0 - 0.1 K/UL    Absolute Immature Granulocyte 0.0 0.00 - 0.04 K/UL    Differential Type AUTOMATED      RBC Comment Target Cells  3+        RBC Comment Microcytosis  2+        RBC Comment Hypochromia  1+       CMP    Collection Time: 11/26/23  6:39 PM   Result Value Ref Range    Sodium 138 136 - 145 mmol/L    Potassium Hemolyzed, Recollection Recommended 3.5 - 5.1 mmol/L    Chloride 107 97 - 108 mmol/L    CO2 23 21 - 32 mmol/L    Anion Gap 8 5 - 15 mmol/L    Glucose 99 65 - 100 mg/dL    BUN 6 6 - 20 mg/dL    Creatinine 0.89 0.55 - 1.02 mg/dL    Bun/Cre Ratio 7 (L) 12 - 20      Est, Glom Filt Rate >60 >60 ml/min/1.73m2    Calcium 9.1 8.5 - 10.1 mg/dL    Total Bilirubin 0.4 0.2 - 1.0 mg/dL    AST Hemolyzed, Recollection Recommended 15 - 37 U/L    ALT 18 12 - 78 U/L    Alk Phosphatase 73 45 - 117 U/L    Total Protein 7.7 6.4 - 8.2 g/dL    Albumin 3.8 3.5 - 5.0 g/dL    Globulin 3.9 2.0 - 4.0 g/dL    Albumin/Globulin Ratio 1.0 (L) 1.1 - 2.2     Rapid influenza A/B antigens    Collection Time: 11/26/23  6:39 PM    Specimen: Nasal Washing   Result Value Ref Range    Influenza A Ag Negative Negative      Influenza B Ag Negative Negative     COVID-19, Rapid    Collection Time: 11/26/23  6:39 PM    Specimen: Nasopharyngeal   Result Value Ref Range    SARS-CoV-2, Rapid DETECTED (A) Not Detected     HCG Qualitative, Serum    Collection Time: 11/26/23  6:39 PM   Result Value Ref Range    hCG Qual Negative Negative     Procalcitonin    Collection Time: 11/26/23  6:39 PM   Result Value Ref Range    Procalcitonin <0.05 (H) 0 ng/mL   Troponin    Collection Time: 11/26/23  6:39 PM   Result Value Ref Range    Troponin, High Sensitivity 7 0 - 51 ng/L   Urinalysis with Reflex to Culture    Collection Time: 11/26/23  7:03 PM    Specimen: Urine   Result Value Ref Range    Color, UA Red      Appearance Turbid (A) Clear      Specific Gravity, UA 1.020 1.003 - 1.030      pH, Urine 7.0 5.0 - 8.0      Protein,
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
## 2023-12-02 LAB
BACTERIA SPEC CULT: NORMAL
BACTERIA SPEC CULT: NORMAL
Lab: NORMAL
Lab: NORMAL

## 2024-02-14 ENCOUNTER — HOSPITAL ENCOUNTER (EMERGENCY)
Facility: HOSPITAL | Age: 44
Discharge: HOME | End: 2024-02-14
Attending: STUDENT IN AN ORGANIZED HEALTH CARE EDUCATION/TRAINING PROGRAM
Payer: MEDICARE

## 2024-02-14 ENCOUNTER — APPOINTMENT (OUTPATIENT)
Facility: HOSPITAL | Age: 44
End: 2024-02-14
Payer: MEDICARE

## 2024-02-14 VITALS
SYSTOLIC BLOOD PRESSURE: 116 MMHG | OXYGEN SATURATION: 100 % | HEIGHT: 67 IN | TEMPERATURE: 98 F | WEIGHT: 145 LBS | BODY MASS INDEX: 22.76 KG/M2 | RESPIRATION RATE: 16 BRPM | DIASTOLIC BLOOD PRESSURE: 82 MMHG | HEART RATE: 97 BPM

## 2024-02-14 DIAGNOSIS — R07.9 CHEST PAIN, UNSPECIFIED TYPE: ICD-10-CM

## 2024-02-14 DIAGNOSIS — R19.7 NAUSEA VOMITING AND DIARRHEA: Primary | ICD-10-CM

## 2024-02-14 DIAGNOSIS — R11.2 NAUSEA VOMITING AND DIARRHEA: Primary | ICD-10-CM

## 2024-02-14 LAB
ALBUMIN SERPL-MCNC: 4.1 G/DL (ref 3.5–5)
ALBUMIN/GLOB SERPL: 1.1 (ref 1.1–2.2)
ALP SERPL-CCNC: 79 U/L (ref 45–117)
ALT SERPL-CCNC: 12 U/L (ref 12–78)
ANION GAP SERPL CALC-SCNC: 4 MMOL/L (ref 5–15)
APPEARANCE UR: CLEAR
AST SERPL-CCNC: 9 U/L (ref 15–37)
BACTERIA URNS QL MICRO: NEGATIVE /HPF
BASOPHILS # BLD: 0 K/UL (ref 0–0.1)
BASOPHILS NFR BLD: 0 % (ref 0–1)
BILIRUB SERPL-MCNC: 0.3 MG/DL (ref 0.2–1)
BILIRUB UR QL: NEGATIVE
BUN SERPL-MCNC: 6 MG/DL (ref 6–20)
BUN/CREAT SERPL: 8 (ref 12–20)
CALCIUM SERPL-MCNC: 9.5 MG/DL (ref 8.5–10.1)
CHLORIDE SERPL-SCNC: 109 MMOL/L (ref 97–108)
CO2 SERPL-SCNC: 26 MMOL/L (ref 21–32)
COLOR UR: ABNORMAL
CREAT SERPL-MCNC: 0.74 MG/DL (ref 0.55–1.02)
D DIMER PPP FEU-MCNC: 0.19 MG/L FEU (ref 0–0.65)
DIFFERENTIAL METHOD BLD: ABNORMAL
EOSINOPHIL # BLD: 0 K/UL (ref 0–0.4)
EOSINOPHIL NFR BLD: 0 % (ref 0–7)
EPITH CASTS URNS QL MICRO: ABNORMAL /LPF
ERYTHROCYTE [DISTWIDTH] IN BLOOD BY AUTOMATED COUNT: 16.4 % (ref 11.5–14.5)
GLOBULIN SER CALC-MCNC: 3.7 G/DL (ref 2–4)
GLUCOSE SERPL-MCNC: 96 MG/DL (ref 65–100)
GLUCOSE UR STRIP.AUTO-MCNC: NEGATIVE MG/DL
HCT VFR BLD AUTO: 36.4 % (ref 35–47)
HGB BLD-MCNC: 12.5 G/DL (ref 11.5–16)
HGB UR QL STRIP: ABNORMAL
HYALINE CASTS URNS QL MICRO: ABNORMAL /LPF (ref 0–2)
IMM GRANULOCYTES # BLD AUTO: 0 K/UL (ref 0–0.04)
IMM GRANULOCYTES NFR BLD AUTO: 0 % (ref 0–0.5)
KETONES UR QL STRIP.AUTO: NEGATIVE MG/DL
LEUKOCYTE ESTERASE UR QL STRIP.AUTO: NEGATIVE
LIPASE SERPL-CCNC: 25 U/L (ref 13–75)
LYMPHOCYTES # BLD: 4.8 K/UL (ref 0.8–3.5)
LYMPHOCYTES NFR BLD: 35 % (ref 12–49)
MAGNESIUM SERPL-MCNC: 1.9 MG/DL (ref 1.6–2.4)
MCH RBC QN AUTO: 23.1 PG (ref 26–34)
MCHC RBC AUTO-ENTMCNC: 34.3 G/DL (ref 30–36.5)
MCV RBC AUTO: 67.2 FL (ref 80–99)
MONOCYTES # BLD: 0.5 K/UL (ref 0–1)
MONOCYTES NFR BLD: 4 % (ref 5–13)
NEUTS SEG # BLD: 8.3 K/UL (ref 1.8–8)
NEUTS SEG NFR BLD: 61 % (ref 32–75)
NITRITE UR QL STRIP.AUTO: NEGATIVE
NRBC # BLD: 0 K/UL (ref 0–0.01)
NRBC BLD-RTO: 0 PER 100 WBC
PH UR STRIP: 6 (ref 5–8)
PLATELET # BLD AUTO: 291 K/UL (ref 150–400)
PMV BLD AUTO: 9.9 FL (ref 8.9–12.9)
POTASSIUM SERPL-SCNC: 3.8 MMOL/L (ref 3.5–5.1)
PROT SERPL-MCNC: 7.8 G/DL (ref 6.4–8.2)
PROT UR STRIP-MCNC: NEGATIVE MG/DL
RBC # BLD AUTO: 5.42 M/UL (ref 3.8–5.2)
RBC #/AREA URNS HPF: ABNORMAL /HPF (ref 0–5)
RBC MORPH BLD: ABNORMAL
SODIUM SERPL-SCNC: 139 MMOL/L (ref 136–145)
SP GR UR REFRACTOMETRY: 1.01 (ref 1–1.03)
SPECIMEN HOLD: NORMAL
TROPONIN I SERPL HS-MCNC: 9 NG/L (ref 0–51)
UROBILINOGEN UR QL STRIP.AUTO: 0.2 EU/DL (ref 0.2–1)
WBC # BLD AUTO: 13.6 K/UL (ref 3.6–11)
WBC URNS QL MICRO: ABNORMAL /HPF (ref 0–4)

## 2024-02-14 PROCEDURE — 99285 EMERGENCY DEPT VISIT HI MDM: CPT

## 2024-02-14 PROCEDURE — 80053 COMPREHEN METABOLIC PANEL: CPT

## 2024-02-14 PROCEDURE — 6360000002 HC RX W HCPCS: Performed by: NURSE PRACTITIONER

## 2024-02-14 PROCEDURE — 83690 ASSAY OF LIPASE: CPT

## 2024-02-14 PROCEDURE — 2580000003 HC RX 258: Performed by: NURSE PRACTITIONER

## 2024-02-14 PROCEDURE — 81001 URINALYSIS AUTO W/SCOPE: CPT

## 2024-02-14 PROCEDURE — 93005 ELECTROCARDIOGRAM TRACING: CPT | Performed by: NURSE PRACTITIONER

## 2024-02-14 PROCEDURE — 96374 THER/PROPH/DIAG INJ IV PUSH: CPT

## 2024-02-14 PROCEDURE — 36415 COLL VENOUS BLD VENIPUNCTURE: CPT

## 2024-02-14 PROCEDURE — 84484 ASSAY OF TROPONIN QUANT: CPT

## 2024-02-14 PROCEDURE — 83735 ASSAY OF MAGNESIUM: CPT

## 2024-02-14 PROCEDURE — 85025 COMPLETE CBC W/AUTO DIFF WBC: CPT

## 2024-02-14 PROCEDURE — 71046 X-RAY EXAM CHEST 2 VIEWS: CPT

## 2024-02-14 PROCEDURE — 84703 CHORIONIC GONADOTROPIN ASSAY: CPT

## 2024-02-14 PROCEDURE — 85379 FIBRIN DEGRADATION QUANT: CPT

## 2024-02-14 RX ORDER — 0.9 % SODIUM CHLORIDE 0.9 %
1000 INTRAVENOUS SOLUTION INTRAVENOUS ONCE
Status: COMPLETED | OUTPATIENT
Start: 2024-02-14 | End: 2024-02-14

## 2024-02-14 RX ORDER — ONDANSETRON 2 MG/ML
4 INJECTION INTRAMUSCULAR; INTRAVENOUS ONCE
Status: COMPLETED | OUTPATIENT
Start: 2024-02-14 | End: 2024-02-14

## 2024-02-14 RX ADMIN — SODIUM CHLORIDE 1000 ML: 9 INJECTION, SOLUTION INTRAVENOUS at 20:56

## 2024-02-14 RX ADMIN — ONDANSETRON 4 MG: 2 INJECTION INTRAMUSCULAR; INTRAVENOUS at 20:55

## 2024-02-14 NOTE — FLOWSHEET NOTE
Per registration, patient went outside because her pants were wet. Went outside to look for patient but was not able to find.

## 2024-02-15 LAB
EKG ATRIAL RATE: 92 BPM
EKG DIAGNOSIS: NORMAL
EKG P AXIS: 69 DEGREES
EKG P-R INTERVAL: 158 MS
EKG Q-T INTERVAL: 372 MS
EKG QRS DURATION: 76 MS
EKG QTC CALCULATION (BAZETT): 460 MS
EKG R AXIS: 65 DEGREES
EKG T AXIS: 57 DEGREES
EKG VENTRICULAR RATE: 92 BPM
HCG SERPL QL: NEGATIVE
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
## 2024-02-15 NOTE — ED PROVIDER NOTES
this dictation was completed with [employer], the [employer] voice recognition software.  Quite often unanticipated grammatical, syntax, homophones, and other interpretive errors are inadvertently transcribed by the computer software.  Please disregard these errors.  Please excuse any errors that have escaped final proofreading.    DERICK Mayberry - NP (electronically signed)        Carson Andrew, DERICK - NP  02/14/24 [id number]

## 2024-02-15 NOTE — DISCHARGE INSTRUCTIONS
Thank you for allowing us to provide you with medical care today.  We realize that you have many choices for your emergency care needs.  We thank you for choosing [facility].  Please choose us in the future for any continued health care needs.     We hope we addressed all of your medical concerns. We strive to provide excellent quality care in the Emergency Department.  Anything less than excellent does not meet our expectations.     The exam and treatment you received in the Emergency Department were for an emergent problem and are not intended as complete care. It is important that you follow up with a doctor, nurse practitioner, or physician’s assistant for ongoing care. If your symptoms worsen or you do not improve as expected and you are unable to reach your usual health care provider, you should return to the Emergency Department. We are available 24 hours a day.     Take this sheet with you when you go to your follow-up visit.     If you have any problem arranging the follow-up visit, contact the Emergency Department immediately.     Make an appointment your family doctor for follow up of this visit. Return to the ER if you are unable to be seen in a timely manner.

## 2024-04-20 ENCOUNTER — HOSPITAL ENCOUNTER (EMERGENCY)
Facility: HOSPITAL | Age: 44
Discharge: HOME | End: 2024-04-20
Attending: EMERGENCY MEDICINE
Payer: MEDICARE

## 2024-04-20 ENCOUNTER — APPOINTMENT (OUTPATIENT)
Facility: HOSPITAL | Age: 44
End: 2024-04-20
Payer: MEDICARE

## 2024-04-20 VITALS
OXYGEN SATURATION: 100 % | HEIGHT: 67 IN | WEIGHT: 150 LBS | DIASTOLIC BLOOD PRESSURE: 73 MMHG | RESPIRATION RATE: 16 BRPM | TEMPERATURE: 98.4 F | HEART RATE: 91 BPM | BODY MASS INDEX: 23.54 KG/M2 | SYSTOLIC BLOOD PRESSURE: 118 MMHG

## 2024-04-20 DIAGNOSIS — K85.20 ALCOHOL-INDUCED ACUTE PANCREATITIS, UNSPECIFIED COMPLICATION STATUS: ICD-10-CM

## 2024-04-20 DIAGNOSIS — R11.2 NAUSEA AND VOMITING, UNSPECIFIED VOMITING TYPE: Primary | ICD-10-CM

## 2024-04-20 LAB
ALBUMIN SERPL-MCNC: 4.3 G/DL (ref 3.5–5)
ALBUMIN/GLOB SERPL: 1.1 (ref 1.1–2.2)
ALP SERPL-CCNC: 81 U/L (ref 45–117)
ALT SERPL-CCNC: 17 U/L (ref 12–78)
ANION GAP SERPL CALC-SCNC: 10 MMOL/L (ref 5–15)
APPEARANCE UR: ABNORMAL
AST SERPL W P-5'-P-CCNC: 13 U/L (ref 15–37)
BACTERIA URNS QL MICRO: ABNORMAL /HPF
BASOPHILS # BLD: 0 K/UL (ref 0–0.1)
BASOPHILS NFR BLD: 0 % (ref 0–1)
BILIRUB SERPL-MCNC: 0.3 MG/DL (ref 0.2–1)
BILIRUB UR QL: NEGATIVE
BUN SERPL-MCNC: 11 MG/DL (ref 6–20)
BUN/CREAT SERPL: 11 (ref 12–20)
CA-I BLD-MCNC: 9.4 MG/DL (ref 8.5–10.1)
CHLORIDE SERPL-SCNC: 104 MMOL/L (ref 97–108)
CO2 SERPL-SCNC: 30 MMOL/L (ref 21–32)
COLOR UR: YELLOW
CREAT SERPL-MCNC: 0.96 MG/DL (ref 0.55–1.02)
DIFFERENTIAL METHOD BLD: ABNORMAL
EOSINOPHIL # BLD: 0 K/UL (ref 0–0.4)
EOSINOPHIL NFR BLD: 0 % (ref 0–7)
EPITH CASTS URNS QL MICRO: ABNORMAL /LPF
ERYTHROCYTE [DISTWIDTH] IN BLOOD BY AUTOMATED COUNT: 13.9 % (ref 11.5–14.5)
GLOBULIN SER CALC-MCNC: 3.9 G/DL (ref 2–4)
GLUCOSE SERPL-MCNC: 125 MG/DL (ref 65–100)
GLUCOSE UR STRIP.AUTO-MCNC: NEGATIVE MG/DL
HCG UR QL: NEGATIVE
HCT VFR BLD AUTO: 39.1 % (ref 35–47)
HGB BLD-MCNC: 13.7 G/DL (ref 11.5–16)
HGB UR QL STRIP: ABNORMAL
IMM GRANULOCYTES # BLD AUTO: 0 K/UL (ref 0–0.04)
IMM GRANULOCYTES NFR BLD AUTO: 0 % (ref 0–0.5)
KETONES UR QL STRIP.AUTO: 15 MG/DL
LEUKOCYTE ESTERASE UR QL STRIP.AUTO: NEGATIVE
LIPASE SERPL-CCNC: 493 U/L (ref 13–75)
LYMPHOCYTES # BLD: 2 K/UL (ref 0.8–3.5)
LYMPHOCYTES NFR BLD: 15 % (ref 12–49)
MCH RBC QN AUTO: 23.7 PG (ref 26–34)
MCHC RBC AUTO-ENTMCNC: 35 G/DL (ref 30–36.5)
MCV RBC AUTO: 67.6 FL (ref 80–99)
MONOCYTES # BLD: 0.5 K/UL (ref 0–1)
MONOCYTES NFR BLD: 4 % (ref 5–13)
NEUTS SEG # BLD: 10.3 K/UL (ref 1.8–8)
NEUTS SEG NFR BLD: 81 % (ref 32–75)
NITRITE UR QL STRIP.AUTO: NEGATIVE
PH UR STRIP: 5 (ref 5–8)
PLATELET # BLD AUTO: 256 K/UL (ref 150–400)
PMV BLD AUTO: 9.7 FL (ref 8.9–12.9)
POTASSIUM SERPL-SCNC: 3.7 MMOL/L (ref 3.5–5.1)
PROT SERPL-MCNC: 8.2 G/DL (ref 6.4–8.2)
PROT UR STRIP-MCNC: ABNORMAL MG/DL
RBC # BLD AUTO: 5.78 M/UL (ref 3.8–5.2)
RBC #/AREA URNS HPF: ABNORMAL /HPF (ref 0–5)
SODIUM SERPL-SCNC: 144 MMOL/L (ref 136–145)
SP GR UR REFRACTOMETRY: 1.02 (ref 1–1.03)
TROPONIN I SERPL HS-MCNC: 4 NG/L (ref 0–51)
UROBILINOGEN UR QL STRIP.AUTO: 1 EU/DL (ref 0.2–1)
WBC # BLD AUTO: 12.8 K/UL (ref 3.6–11)
WBC URNS QL MICRO: ABNORMAL /HPF (ref 0–4)

## 2024-04-20 PROCEDURE — 6360000002 HC RX W HCPCS: Performed by: EMERGENCY MEDICINE

## 2024-04-20 PROCEDURE — 80053 COMPREHEN METABOLIC PANEL: CPT

## 2024-04-20 PROCEDURE — 96375 TX/PRO/DX INJ NEW DRUG ADDON: CPT

## 2024-04-20 PROCEDURE — 36415 COLL VENOUS BLD VENIPUNCTURE: CPT

## 2024-04-20 PROCEDURE — 81001 URINALYSIS AUTO W/SCOPE: CPT

## 2024-04-20 PROCEDURE — C9113 INJ PANTOPRAZOLE SODIUM, VIA: HCPCS | Performed by: EMERGENCY MEDICINE

## 2024-04-20 PROCEDURE — 74176 CT ABD & PELVIS W/O CONTRAST: CPT

## 2024-04-20 PROCEDURE — 84484 ASSAY OF TROPONIN QUANT: CPT

## 2024-04-20 PROCEDURE — 2580000003 HC RX 258: Performed by: EMERGENCY MEDICINE

## 2024-04-20 PROCEDURE — 93005 ELECTROCARDIOGRAM TRACING: CPT | Performed by: EMERGENCY MEDICINE

## 2024-04-20 PROCEDURE — 83690 ASSAY OF LIPASE: CPT

## 2024-04-20 PROCEDURE — 96361 HYDRATE IV INFUSION ADD-ON: CPT

## 2024-04-20 PROCEDURE — 81025 URINE PREGNANCY TEST: CPT

## 2024-04-20 PROCEDURE — 96374 THER/PROPH/DIAG INJ IV PUSH: CPT

## 2024-04-20 PROCEDURE — 85025 COMPLETE CBC W/AUTO DIFF WBC: CPT

## 2024-04-20 PROCEDURE — 99284 EMERGENCY DEPT VISIT MOD MDM: CPT

## 2024-04-20 RX ORDER — 0.9 % SODIUM CHLORIDE 0.9 %
1000 INTRAVENOUS SOLUTION INTRAVENOUS
Status: COMPLETED | OUTPATIENT
Start: 2024-04-20 | End: 2024-04-20

## 2024-04-20 RX ORDER — FAMOTIDINE 20 MG/1
20 TABLET, FILM COATED ORAL 2 TIMES DAILY
Qty: 60 TABLET | Refills: 0 | Status: SHIPPED | OUTPATIENT
Start: 2024-04-20 | End: 2024-05-20

## 2024-04-20 RX ORDER — ONDANSETRON 4 MG/1
4 TABLET, ORALLY DISINTEGRATING ORAL 3 TIMES DAILY PRN
Qty: 21 TABLET | Refills: 0 | Status: SHIPPED | OUTPATIENT
Start: 2024-04-20

## 2024-04-20 RX ORDER — ONDANSETRON 2 MG/ML
4 INJECTION INTRAMUSCULAR; INTRAVENOUS ONCE
Status: COMPLETED | OUTPATIENT
Start: 2024-04-20 | End: 2024-04-20

## 2024-04-20 RX ADMIN — SODIUM CHLORIDE 1000 ML: 9 INJECTION, SOLUTION INTRAVENOUS at 20:26

## 2024-04-20 RX ADMIN — ONDANSETRON 4 MG: 2 INJECTION INTRAMUSCULAR; INTRAVENOUS at 19:25

## 2024-04-20 RX ADMIN — SODIUM CHLORIDE 1000 ML: 9 INJECTION, SOLUTION INTRAVENOUS at 19:25

## 2024-04-20 RX ADMIN — SODIUM CHLORIDE, PRESERVATIVE FREE 40 MG: 5 INJECTION INTRAVENOUS at 20:27

## 2024-04-20 ASSESSMENT — PAIN - FUNCTIONAL ASSESSMENT
PAIN_FUNCTIONAL_ASSESSMENT: 0-10
PAIN_FUNCTIONAL_ASSESSMENT: ACTIVITIES ARE NOT PREVENTED
PAIN_FUNCTIONAL_ASSESSMENT: 0-10

## 2024-04-20 ASSESSMENT — PAIN DESCRIPTION - PAIN TYPE: TYPE: CHRONIC PAIN

## 2024-04-20 ASSESSMENT — PAIN DESCRIPTION - LOCATION: LOCATION: ABDOMEN

## 2024-04-20 ASSESSMENT — PAIN DESCRIPTION - DESCRIPTORS: DESCRIPTORS: SORE

## 2024-04-20 ASSESSMENT — PAIN SCALES - GENERAL
PAINLEVEL_OUTOF10: 3
PAINLEVEL_OUTOF10: 10

## 2024-04-20 ASSESSMENT — PAIN DESCRIPTION - FREQUENCY: FREQUENCY: CONTINUOUS

## 2024-04-20 ASSESSMENT — LIFESTYLE VARIABLES
HOW OFTEN DO YOU HAVE A DRINK CONTAINING ALCOHOL: MONTHLY OR LESS
HOW MANY STANDARD DRINKS CONTAINING ALCOHOL DO YOU HAVE ON A TYPICAL DAY: 1 OR 2

## 2024-04-20 ASSESSMENT — PAIN DESCRIPTION - ONSET: ONSET: ON-GOING

## 2024-04-20 NOTE — ED TRIAGE NOTES
Pt reports intermittent black tarry diarrhea, n/v, abd pain, back pain and dizziness since 2017. This current episode began 1- 1 1/2 months ago, vomiting is worse today so she came to the ED. Pt reports she is new to the area and does not have a PCP.

## 2024-04-21 NOTE — ED PROVIDER NOTES
ZOLOFT  Take 1 tablet by mouth daily     traZODone 50 MG tablet  Commonly known as: DESYREL  Take 1 tablet by mouth nightly as needed for Sleep               Where to Get Your Medications        These medications were [id number] - [place], [place] [facility] [address] P [phone] - F [phone]  [address] [address]      Phone: [phone]   famotidine 20 MG tablet  ondansetron 4 MG disintegrating tablet           DISCONTINUED MEDICATIONS:  Current Discharge Medication List          I am the Primary Clinician of Record. Maria G restrepo MD (electronically signed)    (Please note that parts of this dictation were completed with voice recognition software. Quite often unanticipated grammatical, syntax, homophones, and other interpretive errors are inadvertently transcribed by the computer software. Please disregards these errors. Please excuse any errors that have escaped final proofreading.)     Maria G Restrepo MD  04/20/24 5312

## 2024-04-22 LAB
EKG ATRIAL RATE: 80 BPM
EKG DIAGNOSIS: NORMAL
EKG P AXIS: 77 DEGREES
EKG P-R INTERVAL: 158 MS
EKG Q-T INTERVAL: 396 MS
EKG QRS DURATION: 76 MS
EKG QTC CALCULATION (BAZETT): 456 MS
EKG R AXIS: 61 DEGREES
EKG T AXIS: 61 DEGREES
EKG VENTRICULAR RATE: 80 BPM